# Patient Record
Sex: FEMALE | Race: WHITE | NOT HISPANIC OR LATINO | Employment: OTHER | URBAN - METROPOLITAN AREA
[De-identification: names, ages, dates, MRNs, and addresses within clinical notes are randomized per-mention and may not be internally consistent; named-entity substitution may affect disease eponyms.]

---

## 2017-06-05 DIAGNOSIS — E11.9 TYPE 2 DIABETES MELLITUS WITHOUT COMPLICATIONS (HCC): ICD-10-CM

## 2017-06-26 ENCOUNTER — ALLSCRIPTS OFFICE VISIT (OUTPATIENT)
Dept: OTHER | Facility: OTHER | Age: 82
End: 2017-06-26

## 2018-01-10 NOTE — PROGRESS NOTES
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 (250 40,585 4) (E11 22,N18 4)   2  Urinary incontinence (788 30) (R32)   3  Need for pneumococcal vaccination (V03 82) (Z23)   4  Medicare annual wellness visit, subsequent (V70 0) (Z00 00)    Plan  Medicare annual wellness visit, subsequent    · After you empty your bladder, wait a moment and try to go again  Do not strain or push to  empty ; Status:Active; Requested [id number];    · Begin a limited exercise program ; Status:Complete;   Done: 01MTE4951   · Brush your teeth {freq1} and floss at least once a day ; Status:Complete;   Done:  81XZO2511   · Eat a low fat and low cholesterol diet ; Status:Complete;   Done: 73FGW1083   · Eat no more than 30 grams of fat per day ; Status:Complete;   Done: 63UIT8793   · Keep a diary of when and what you eat ; Status:Complete;   Done: 53PMS7183   · Some eating tips that can help you lose weight ; Status:Complete;   Done: 71NCZ4649   · The plan of care for urinary incontinence is detailed in the plan and/or discussion section  of today's note ; Status:Complete;   Done: 18UYE5065   · There are many exercise options for seniors ; Status:Complete;   Done: 13OJZ0629   · There are ways to decrease your stress and improve your sense of well-being  We  encourage you to keep active and exercise regularly  Make time to take care of yourself  and participate in activities that you enjoy  Stay connected to friends and family that can  support and comfort you  If at any time you have thoughts of harming yourself or  someone else, contact us immediately ; Status:Active; Requested [id number];    · There ways to avoid falling ; Status:Complete;   Done: 10GJM5847   · These are things you can do to prevent falls in and around the home ; Status:Complete;    Done: 08SVD3580   · We encourage all of our patients to exercise regularly    30 minutes of exercise or physical  activity five or more days a week is recommended for children and adults ;  Status:Complete;   Done: 27FEM6711   · We encourage you to begin to make lifestyle changes to help control your blood  pressure  These may include losing weight, increasing your activity level, limiting salt in  your diet, decreasing alcohol intake, and eating a diet low in fat and rich in fruits  and vegetables ; Status:Complete;   Done: 01WQM8046   · We have prescribed Kegel exercises to be done in a set of 15 repetitions, 3 times a day ;  Status:Complete;   Done: 72QEG2490   · We recommend routine visits to a dentist ; Status:Complete;   Done: 45NPU7612   · We recommend that you bring your body mass index down to 26 ; Status:Complete;    Done: 00VAM8715   · We recommend that you create an advance directive ; Status:Complete;   Done:  98HYU4967   · We recommend you modify your diet to achieve and maintain a healthy weight  Being  overweight may increase your risk for developing health problems such as diabetes,  heart disease, and cancer  Avoid high fat foods and eat a balanced diet rich  in fruits and vegetables  The combination of a reduced-calorie diet and increased  physical activity is recommended  Please let us know if you would like to  learn more about your nutrition and calorie needs, and additional options including  weight loss programs that can help you achieve your goals ; Status:Complete;   Done:  60EXR7565   · We recommend you modify your diet to achieve and maintain a healthy weight  Being  underweight may increase your risk of developing health problems from vitamin and  mineral deficiencies  We recommend a balanced diet rich in fruits and vegetables  You  may also consider increasing your calorie intake by eating more frequently or adding  nuts, avocados, and low-fat cheese or milk to your meals    Please let us know  if you would like to learn more about your nutrition and calorie needs, and additional  options to help you achieve your weight goals ; Status:Complete;   Done: 70PNP3587   · Call [phone] if: You find a new or different kind of lump in your breast ;  Status:Complete;   Done: 73DGW1999   · Call [phone] if: You have any bleeding from the vagina ; Status:Complete;    Done: 51VPP2113   · Call [phone] if: You have any warning signs of skin cancer ; Status:Complete;    Done: 29PTM5724   · Call [phone] if: You experience a new kind of chest pain (angina) or pressure ;  Status:Complete;   Done: 84OVW3744  Need for pneumococcal vaccination    · Prevnar 13 Intramuscular Suspension; INJECT 0 5  ML Intramuscular; To Be  Done: 42HYA7784    Discussion/Summary  Impression: Subsequent Annual Wellness Visit  Cardiovascular screening and counseling: screening is current  Diabetes screening and counseling: screening not indicated  Colorectal cancer screening and counseling: screening is current  Breast cancer screening and counseling: screening not indicated  Cervical cancer screening and counseling: screening not indicated  Osteoporosis screening and counseling: screening is current  Abdominal aortic aneurysm screening and counseling: screening not indicated  Glaucoma screening and counseling: screening is current  HIV screening and counseling: screening not indicated  Immunizations: influenza vaccine is up to date this year, the lifetime pneumococcal vaccine has been completed, hepatitis B vaccination series is not indicated at this time due to the patient's low risk of tevin the disease, Zostavax vaccination up to date, Td vaccination status is unknown and Tdap vaccination up to date  Chief Complaint  MEDICARE WELLNESS      History of Present Illness  Welcome to Medicare and Wellness Visits: The patient is being seen for the subsequent annual wellness visit     Co-Managers and Medical Equipment/Suppliers: See Patient Care Team   Reviewed Updated Marton Halsted:   Last Medicare Wellness Visit Information was reviewed, patient interviewed, no change since last AWV  Preventive Quality Program 65 and Older: Falls Risk: The patient fell 0 times in the past 12 months  Urinary Incontinence Symptoms includes: urinary incontinence and nocturia        Patient Care Team    Care Team Member Role Specialty Office Number   Sue Gomez        Active Problems    1  Abnormal blood chemistry (790 6) (R79 9)   2  Allergic rhinitis (477 9) (J30 9)   3  Depression screen (V79 0) (Z13 89)   4  Diabetes mellitus type II, controlled (250 00) (E11 9)   5  Encounter for screening for other nervous system disorder (V80 09) (Z13 858)   6  Encounter for special screening examination for genitourinary disorder (V81 6) (Z13 89)   7  Esophageal reflux (530 81) (K21 9)   8  Glaucoma Screening   9  Hyperglycemia (790 29) (R73 9)   10  Hyperlipidemia (272 4) (E78 5)   11  Hypertension (401 9) (I10)   12  Hypertensive urgency (401 9) (I16 0)   13  Need for prophylactic vaccination and inoculation against influenza (V04 81) (Z23)   14  Need for Tdap vaccination (V06 1) (Z23)   15  Osteoarthritis (715 90) (M19 90)   16  Palpitations (785 1) (R00 2)   17  Screening for diabetes mellitus (DM) (V77 1) (Z13 1)   18  Screening for ischemic heart disease (V81 0) (Z13 6)   19  Screening for osteoporosis (V82 81) (Z13 820)    Past Medical History    · History of cellulitis (V13 3) (Z87 2)    Surgical History    · History of Appendectomy   · History of Breast Surgery Mastectomy   · History of Cataract Surgery   · History of Complete Colonoscopy   · History of Hand Surgery   · History of Total Abdominal Hysterectomy With Removal Of Both Ovaries    Family History  Mother    · Family history of Diabetes Mellitus (V18 0)   · Family history of Valvular Heart Disease  Father    · Family history of Coal Workers' Pneumoconiosis (Complicated)   · Family history of Diabetes Mellitus (V18 0)    Social History    · Never A Smoker    Current Meds   1  CloNIDine HCl - 0 1 MG Oral Tablet;  Take 1 tablet by mouth at bedtime; Therapy: 28FAC4891 to (Evaluate:12Uol2346)  Requested for: 93LKL2213; Last   Rx:62Qte0078 Ordered   2  Latanoprost 0 005 % Ophthalmic Solution; Therapy: 84LJI1892 to Recorded   3  Lisinopril 20 MG Oral Tablet; Take one tablet daily  Requested for: 57ZNT6704; Last   Rx:20Bai8561 Ordered   4  Sulindac 200 MG Oral Tablet; Take 1 tablet every 12 hours with food; Therapy: 34PZI0056 to (Evaluate:74Ogl2949)  Requested for: 20SSD0520; Last   Rx:21Wtl1652 Ordered   5  Triamterene-HCTZ 37 5-25 MG Oral Capsule; take 1 capsule daily; Therapy: 21SHT4170 to (Evaluate:02Hcb4705)  Requested for: 28CBG4366; Last   Rx:50Rpg5970 Ordered    Allergies    1  Lipitor TABS   2  Morphine Derivatives   3  TETANUS    Immunizations   1 2 3 4    Influenza  09-Oct-2012 2013 28-Oct-2014 05-Oct-2015    PPSV  2010       Tdap  24-Aug-2015 24-Aug-2015      Zoster  2013        Vitals  Signs    Heart Rate: 90  Respiration: 15  Systolic: 525  Diastolic: 68  Height: 5 ft 4 in  Weight: 189 lb   BMI Calculated: 32 44  BSA Calculated: 1 91  O2 Saturation: 96    Results/Data  CreatinineClearanceMDRD 99FMK8334 02:14PM Bernard Talihina     Test Name Result Flag Reference   Creatinine Clearance (MDRD) - CKD-EPI 29 53 ml/min/1 73 m²     Sex: Female  Age: 80  Ethnicity: White/  Serum Creatinine: 1 58 mg/dL  Traceable to IDMS: Yes   Creatinine Clearance (MDRD) - MDRD-GFR 31 08 ml/min/1 73 m²     Sex: Female  Age: 80  Ethnicity:  White/  Serum Creatinine: 1 58 mg/dL  Traceable to IDMS: Yes       Signatures   Electronically signed by : Chucky Frias MD; Jun 26 2017  2:23PM EST                       (Author)

## 2018-01-13 VITALS
WEIGHT: 189 LBS | HEIGHT: 64 IN | HEART RATE: 90 BPM | SYSTOLIC BLOOD PRESSURE: 136 MMHG | RESPIRATION RATE: 15 BRPM | OXYGEN SATURATION: 96 % | DIASTOLIC BLOOD PRESSURE: 68 MMHG | BODY MASS INDEX: 32.27 KG/M2

## 2018-02-14 DIAGNOSIS — I10 ESSENTIAL HYPERTENSION: Primary | ICD-10-CM

## 2018-02-14 RX ORDER — CLONIDINE HYDROCHLORIDE 0.1 MG/1
0.1 TABLET ORAL ONCE
Qty: 30 TABLET | Refills: 5 | Status: SHIPPED | OUTPATIENT
Start: 2018-02-14 | End: 2018-08-13 | Stop reason: SDUPTHER

## 2018-02-14 RX ORDER — TRIAMTERENE AND HYDROCHLOROTHIAZIDE 37.5; 25 MG/1; MG/1
1 CAPSULE ORAL DAILY
COMMUNITY
Start: 2013-02-15 | End: 2018-02-14 | Stop reason: SDUPTHER

## 2018-02-14 RX ORDER — CLONIDINE HYDROCHLORIDE 0.1 MG/1
1 TABLET ORAL
COMMUNITY
Start: 2013-02-15 | End: 2018-02-14 | Stop reason: SDUPTHER

## 2018-02-14 RX ORDER — TRIAMTERENE AND HYDROCHLOROTHIAZIDE 37.5; 25 MG/1; MG/1
1 CAPSULE ORAL DAILY
Qty: 30 CAPSULE | Refills: 5 | Status: SHIPPED | OUTPATIENT
Start: 2018-02-14 | End: 2018-08-13 | Stop reason: SDUPTHER

## 2018-02-14 RX ORDER — LISINOPRIL 20 MG/1
20 TABLET ORAL DAILY
Qty: 30 TABLET | Refills: 5 | Status: SHIPPED | OUTPATIENT
Start: 2018-02-14 | End: 2018-08-13 | Stop reason: SDUPTHER

## 2018-02-14 RX ORDER — LISINOPRIL 20 MG/1
1 TABLET ORAL DAILY
COMMUNITY
End: 2018-02-14 | Stop reason: SDUPTHER

## 2018-08-13 DIAGNOSIS — I10 ESSENTIAL HYPERTENSION: ICD-10-CM

## 2018-08-13 RX ORDER — SULINDAC 200 MG/1
200 TABLET ORAL 2 TIMES DAILY
Qty: 180 TABLET | Refills: 3 | Status: SHIPPED | OUTPATIENT
Start: 2018-08-13 | End: 2019-12-04 | Stop reason: SDUPTHER

## 2018-08-13 RX ORDER — LISINOPRIL 20 MG/1
20 TABLET ORAL DAILY
Qty: 30 TABLET | Refills: 0 | Status: SHIPPED | OUTPATIENT
Start: 2018-08-13 | End: 2018-09-17 | Stop reason: SDUPTHER

## 2018-08-13 RX ORDER — SULINDAC 200 MG/1
1 TABLET ORAL
COMMUNITY
Start: 2013-02-15 | End: 2018-08-13 | Stop reason: SDUPTHER

## 2018-08-13 RX ORDER — TRIAMTERENE AND HYDROCHLOROTHIAZIDE 37.5; 25 MG/1; MG/1
1 CAPSULE ORAL DAILY
Qty: 30 CAPSULE | Refills: 0 | Status: SHIPPED | OUTPATIENT
Start: 2018-08-13 | End: 2018-09-17 | Stop reason: SDUPTHER

## 2018-08-13 RX ORDER — CLONIDINE HYDROCHLORIDE 0.1 MG/1
0.1 TABLET ORAL ONCE
Qty: 30 TABLET | Refills: 0 | Status: SHIPPED | OUTPATIENT
Start: 2018-08-13 | End: 2018-09-17 | Stop reason: SDUPTHER

## 2018-09-17 DIAGNOSIS — I10 ESSENTIAL HYPERTENSION: ICD-10-CM

## 2018-09-17 RX ORDER — CLONIDINE HYDROCHLORIDE 0.1 MG/1
0.1 TABLET ORAL ONCE
Qty: 30 TABLET | Refills: 5 | Status: SHIPPED | OUTPATIENT
Start: 2018-09-17 | End: 2018-09-17

## 2018-09-17 RX ORDER — LISINOPRIL 20 MG/1
20 TABLET ORAL DAILY
Qty: 30 TABLET | Refills: 5 | Status: SHIPPED | OUTPATIENT
Start: 2018-09-17 | End: 2019-03-14 | Stop reason: SDUPTHER

## 2018-09-17 RX ORDER — TRIAMTERENE AND HYDROCHLOROTHIAZIDE 37.5; 25 MG/1; MG/1
1 CAPSULE ORAL DAILY
Qty: 30 CAPSULE | Refills: 5 | Status: SHIPPED | OUTPATIENT
Start: 2018-09-17 | End: 2019-03-14 | Stop reason: SDUPTHER

## 2018-11-06 RX ORDER — FLUTICASONE PROPIONATE 50 MCG
SPRAY, SUSPENSION (ML) NASAL
COMMUNITY
End: 2021-06-21

## 2018-11-06 RX ORDER — ASPIRIN 81 MG/1
TABLET, CHEWABLE ORAL
COMMUNITY
Start: 2013-11-17 | End: 2021-06-21

## 2018-11-06 RX ORDER — CHLORAL HYDRATE 500 MG
CAPSULE ORAL
COMMUNITY
End: 2018-11-09 | Stop reason: SDUPTHER

## 2018-11-06 RX ORDER — LATANOPROST 50 UG/ML
SOLUTION/ DROPS OPHTHALMIC
COMMUNITY
Start: 2014-07-17 | End: 2021-06-21

## 2018-11-07 ENCOUNTER — OFFICE VISIT (OUTPATIENT)
Dept: FAMILY MEDICINE CLINIC | Facility: CLINIC | Age: 83
End: 2018-11-07
Payer: MEDICARE

## 2018-11-07 VITALS
BODY MASS INDEX: 30.38 KG/M2 | SYSTOLIC BLOOD PRESSURE: 134 MMHG | TEMPERATURE: 99 F | WEIGHT: 177 LBS | OXYGEN SATURATION: 98 % | DIASTOLIC BLOOD PRESSURE: 60 MMHG | HEART RATE: 86 BPM

## 2018-11-07 DIAGNOSIS — M25.579 ARTHRALGIA OF TOE, UNSPECIFIED LATERALITY: ICD-10-CM

## 2018-11-07 DIAGNOSIS — E55.9 VITAMIN D DEFICIENCY: ICD-10-CM

## 2018-11-07 DIAGNOSIS — Z13.29 SCREENING FOR HYPOTHYROIDISM: ICD-10-CM

## 2018-11-07 DIAGNOSIS — L97.509 ULCER OF FOOT, UNSPECIFIED LATERALITY, UNSPECIFIED ULCER STAGE (HCC): Primary | ICD-10-CM

## 2018-11-07 DIAGNOSIS — Z13.0 SCREENING FOR DEFICIENCY ANEMIA: ICD-10-CM

## 2018-11-07 DIAGNOSIS — R73.9 HYPERGLYCEMIA: ICD-10-CM

## 2018-11-07 DIAGNOSIS — E53.8 VITAMIN B 12 DEFICIENCY: ICD-10-CM

## 2018-11-07 DIAGNOSIS — Z13.1 SCREENING FOR DIABETES MELLITUS: ICD-10-CM

## 2018-11-07 DIAGNOSIS — M21.962 DEFORMITY OF METATARSAL BONE OF LEFT FOOT: ICD-10-CM

## 2018-11-07 DIAGNOSIS — Z13.220 SCREENING FOR HYPERLIPIDEMIA: ICD-10-CM

## 2018-11-07 PROCEDURE — 99214 OFFICE O/P EST MOD 30 MIN: CPT | Performed by: NURSE PRACTITIONER

## 2018-11-07 RX ORDER — CEPHALEXIN 250 MG/1
500 CAPSULE ORAL 4 TIMES DAILY
Qty: 24 CAPSULE | Refills: 0 | Status: SHIPPED | OUTPATIENT
Start: 2018-11-07 | End: 2018-11-10

## 2018-11-07 RX ORDER — ACETAMINOPHEN 500 MG
500 TABLET ORAL EVERY 4 HOURS PRN
Qty: 120 TABLET | Refills: 0 | Status: SHIPPED | OUTPATIENT
Start: 2018-11-07 | End: 2021-12-27 | Stop reason: ALTCHOICE

## 2018-11-07 RX ORDER — IBUPROFEN 800 MG/1
800 TABLET ORAL EVERY 8 HOURS PRN
Qty: 90 TABLET | Refills: 1 | Status: SHIPPED | OUTPATIENT
Start: 2018-11-07 | End: 2018-11-07 | Stop reason: ALTCHOICE

## 2018-11-07 NOTE — PATIENT INSTRUCTIONS
Diabetic Foot Ulcers   WHAT YOU NEED TO KNOW:   What is a diabetic foot ulcer? A diabetic foot ulcer is an open sore that can develop anywhere on your foot or toes  The ulcers usually develop on the bottom of the foot  You may first notice drainage on your sock  Drainage is fluid that may be yellow, brown, or red  The fluid may also contain pus or blood  What increases my risk for a diabetic foot ulcer? · Blood sugar levels that are not controlled    · Nerve damage and numbness in your feet    · Poor blood flow     · A foot deformity, such as a bunion or hammertoe    · Calluses or corns on your feet or toes    · A decrease in vision that keeps you from seeing your feet clearly    · Being overweight    · Cigarette smoking or alcohol use  How is a diabetic foot ulcer diagnosed and treated? Your healthcare provider will ask about your symptoms and examine your foot and the ulcer  He may check your shoes  He may also send you to a podiatrist (foot doctor) for treatment  The goal of treatment is to start healing your foot ulcer as soon as possible  The risk for infection decreases with faster healing  Do the following to help your ulcer heal:  · Prevent or treat an infection  A bandage will be put on your ulcer  Your healthcare provider will give you instructions on changing your bandage  You may need to clean the wound and change the bandage daily  The bandage may contain medicines to help your ulcer heal  You may be asked to put medicine on your foot ulcer before you put on the bandage  The medicine may also prevent growth of tissue that is not healthy  If you have an infection, your healthcare provider will give you antibiotics to treat it  · Have any dead tissue debrided (removed)  The removal of dead skin and tissue around your foot ulcer can help with healing  · Manage your blood sugar levels and other health problems    Your blood sugar, blood pressure, and cholesterol levels need to be controlled to help your foot ulcer heal  Your healthcare provider can help you make a plan to manage your health problems  · Offload (take the pressure off) the foot ulcer  You may need special shoes with insoles, cushions, or braces  You may be asked to use a wheelchair or crutches until your foot ulcer heals  These items will help keep pressure and irritation off the area of your foot ulcer  Your foot ulcer can heal faster without pressure and irritation  · Have blood flow to your foot increased  Your healthcare provider may use hyperbaric oxygen therapy or negative pressure wound therapy to increase blood flow  Ask for more information about these therapies  · Go to specialists as directed  Your healthcare provider may recommend you see a podiatrist, or an orthopedic or vascular surgeon  These healthcare providers can help manage your treatment  What can I do to prevent diabetic foot ulcers? Good foot care may help prevent ulcers, or keep them from getting worse  Ask someone to help you if you are not able to check or care for your feet  The following can help you prevent diabetic foot ulcers:  · Keep your blood sugar levels under control  Continue the plan for your diabetes that you and your healthcare provider have discussed  Healthy food choices and taking your medicines as directed may help control blood sugars  Contact your healthcare provider if your blood sugar levels are higher than directed  · Wash your feet each day with soap and warm water  Do not use hot water, because this can injure your foot  Dry your feet gently with a towel after you wash them  Dry between and under your toes  Put lotion or moisturizer on your feet  Do not  put lotion or moisturizer between your toes  · Check your feet each day  Look at your whole foot, including the bottom, and between and under your toes  Check for wounds, corns, and calluses   Feel your feet by running your hands along the tops, bottoms, sides, and between your toes  Use a nonbreakable mirror to check your feet if you have trouble seeing the bottoms  Do not try to remove corns or calluses yourself  File or cut your toenails straight across  · Protect your feet  Do not walk barefoot or wear your shoes without socks  Check your shoes for rocks or other objects that can hurt your feet  Wear cotton socks to help keep your feet dry  Wear socks without toe seams, or wear them with the seams inside out  Change your socks each day  Do not wear socks that are dirty or damp  · Wear shoes that fit well  Wear shoes that do not rub against any area of your feet  Your shoes should be ½ to ¾ inch (1 to 2 centimeters) longer than your feet  Your shoes should also have extra space around the widest part of your feet  Walking or athletic shoes with laces or straps that adjust are best  Ask your healthcare provider for help to choose the right shoes for you  Ask him if you need to wear an insert, orthotic, or bandage on your feet  · Do not smoke  Nicotine can damage your blood vessels and increase your risk for foot ulcers  Do not use e-cigarettes or smokeless tobacco in place of cigarettes or to help you quit  They still contain nicotine  Ask your healthcare provider for information if you currently smoke and need help quitting  · Do not drink alcohol  Alcohol increases your blood sugar levels and make your diabetes more difficult to manage  Ask your healthcare provider for information if you need help to quit drinking alcohol  · Maintain a healthy weight  Ask your healthcare provider how much you should weigh  A healthy weight can help you control your diabetes  Ask him to help you create a weight loss plan if you are overweight  Even a 10 to 15 pound weight loss can help you manage your blood sugar level  When should I or someone close to me call 911? · You feel faint or become confused  When should I seek immediate care?    · You have a fever with chills  · You begin vomiting  When should I contact my healthcare provider? · You see new drainage on your sock  · Your foot becomes red, warm, and swollen  · Your foot ulcer has a bad smell or is draining pus  · You feel pain in a foot that used to have little or no feeling  · You see black or dead tissue in or around your ulcer  · Your ulcer becomes bigger, deeper, or does not heal      · You have questions or concerns about your condition or care  CARE AGREEMENT:   You have the right to help plan your care  Learn about your health condition and how it may be treated  Discuss treatment options with your caregivers to decide what care you want to receive  You always have the right to refuse treatment  The above information is an  only  It is not intended as medical advice for individual conditions or treatments  Talk to your doctor, nurse or pharmacist before following any medical regimen to see if it is safe and effective for you  © 2017 [employer] Information is for End User's use only and may not be sold, redistributed or otherwise used for commercial purposes  All illustrations and images included in CareNotes® are the copyrighted property of [employer]  or Russell Roberts

## 2018-11-07 NOTE — PROGRESS NOTES
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  -     Ambulatory referral to Podiatry; Future    Screening for deficiency anemia  -     CBC and differential    Screening for diabetes mellitus  -     Comprehensive metabolic panel  -     Insulin, fasting  -     Hemoglobin A1C    Hyperglycemia  -     Comprehensive metabolic panel  -     Insulin, fasting  -     Hemoglobin A1C    Screening for hyperlipidemia  -     Lipid panel    Screening for hypothyroidism  -     TSH, 3rd generation    Arthralgia of toe, unspecified laterality  -     Uric acid  -     Discontinue: ibuprofen (MOTRIN) 800 mg tablet; Take 1 tablet (800 mg total) by mouth every 8 (eight) hours as needed for mild pain with food  -     acetaminophen (TYLENOL) 500 mg tablet; Take 1 tablet (500 mg total) by mouth every 4 (four) hours as needed for mild pain    Vitamin D deficiency  -     Vitamin D 1,25 dihydroxy    Vitamin B 12 deficiency  -     Vitamin B12    Other orders  -     latanoprost (XALATAN) 0 005 % ophthalmic solution; Apply to eye  -     Multiple Vitamins-Minerals (TGT MULTIVITAMIN/MULTIMINERAL) TABS; Take by mouth  -     lysine 500 MG TABS; Take by mouth  -     fluticasone (FLONASE) 50 mcg/act nasal spray; into each nostril  -     Omega-3 Fatty Acids (FISH OIL) 1,000 mg; Take by mouth  -     aspirin 81 mg chewable tablet; Chew          Subjective:      Patient ID: Liat Alvarez is a 80 y o  female  Patient presents to [address] [place] [address] with complaints of pain to the bilateral feet  Allergies, medical history and current medications reviewed with patient; patient reports taking all medications as prescribed without issues   Patient reports pain in feet has been ongoing for several weeks  Patient reports about 1 5 years ago her toe was very red and was given antibiotics  Patient reports she thinks the big toe is rubbing up against the 2nd toe  Patient also C/O a "bruise" to the left thumbnail  Patient denies any trauma, but reports that the nail started by turning green before getting dark and tunneling under the fingernail; patient denies pain, redness or swelling and reports that it seems to be "going away "       Review of Systems   Constitutional: Negative for activity change, appetite change, chills, fatigue, fever and unexpected weight change  HENT: Negative for congestion, ear pain, hearing loss, postnasal drip, rhinorrhea, sinus pressure, sore throat and voice change  Eyes: Negative for pain, itching and visual disturbance  Respiratory: Negative for cough, chest tightness and shortness of breath  Cardiovascular: Negative for chest pain, palpitations and leg swelling  Gastrointestinal: Negative for abdominal pain, blood in stool, constipation, diarrhea, nausea and vomiting  Endocrine: Negative for cold intolerance and heat intolerance  Genitourinary: Negative for difficulty urinating, dysuria, frequency, hematuria and urgency  Leakage   Musculoskeletal: Positive for arthralgias and joint swelling  Negative for back pain and myalgias  Skin: Positive for wound  Negative for color change and rash  Allergic/Immunologic: Negative  Neurological: Negative for dizziness, weakness, light-headedness, numbness and headaches  Hematological: Negative  Psychiatric/Behavioral: Negative  Objective:    /60 (BP Location: Left arm, Patient Position: Sitting, Cuff Size: Standard)   Pulse 86   Temp 99 °F (37 2 °C) (Temporal)   Wt 80 3 kg (177 lb)   SpO2 98%   BMI 30 38 kg/m²      Physical Exam   Constitutional: She is oriented to person, place, and time  She appears well-developed and well-nourished  No distress  HENT:   Head: Normocephalic and atraumatic  Right Ear: Tympanic membrane, external ear and ear canal normal    Left Ear: Tympanic membrane, external ear and ear canal normal    Nose: Nose normal  No mucosal edema  Right sinus exhibits no maxillary sinus tenderness and no frontal sinus tenderness  Left sinus exhibits no maxillary sinus tenderness and no frontal sinus tenderness  Mouth/Throat: Uvula is midline, oropharynx is clear and moist and mucous membranes are normal  No oropharyngeal exudate, posterior oropharyngeal edema or posterior oropharyngeal erythema  Nasal turbinates pink, moist and without exudate  Eyes: Pupils are equal, round, and reactive to light  Conjunctivae, EOM and lids are normal  Right eye exhibits no discharge  Left eye exhibits no discharge  Right conjunctiva is not injected  Left conjunctiva is not injected  Neck: Normal range of motion  Neck supple  No tracheal deviation, no edema and no erythema present  No thyromegaly present  Cardiovascular: Normal rate, regular rhythm, S1 normal and S2 normal     Murmur heard  Pulses:       Radial pulses are 2+ on the right side, and 2+ on the left side  Dorsalis pedis pulses are 2+ on the right side, and 2+ on the left side  Pulmonary/Chest: Effort normal and breath sounds normal  No respiratory distress  Abdominal: Soft  Bowel sounds are normal  She exhibits no distension and no mass  There is no hepatosplenomegaly  There is no tenderness  There is no guarding  Musculoskeletal: Normal range of motion  She exhibits no edema, tenderness or deformity  Lymphadenopathy:     She has no cervical adenopathy  Neurological: She is alert and oriented to person, place, and time  She has normal strength and normal reflexes  She displays a negative Romberg sign  Skin: Skin is warm, dry and intact  Left lower extremity cool to touch; pulses intact   Psychiatric: She has a normal mood and affect   Her speech is normal  Nursing note and vitals reviewed

## 2018-11-08 ENCOUNTER — CLINICAL SUPPORT (OUTPATIENT)
Dept: FAMILY MEDICINE CLINIC | Facility: CLINIC | Age: 83
End: 2018-11-08
Payer: MEDICARE

## 2018-11-08 DIAGNOSIS — E55.9 VITAMIN D DEFICIENCY: ICD-10-CM

## 2018-11-08 DIAGNOSIS — Z13.29 SCREENING FOR HYPOTHYROIDISM: ICD-10-CM

## 2018-11-08 DIAGNOSIS — Z13.0 SCREENING FOR DEFICIENCY ANEMIA: ICD-10-CM

## 2018-11-08 DIAGNOSIS — M25.579 ARTHRALGIA OF TOE, UNSPECIFIED LATERALITY: Primary | ICD-10-CM

## 2018-11-08 DIAGNOSIS — Z13.220 SCREENING FOR HYPERLIPIDEMIA: ICD-10-CM

## 2018-11-08 DIAGNOSIS — Z13.1 SCREENING FOR DIABETES MELLITUS: ICD-10-CM

## 2018-11-08 DIAGNOSIS — E53.8 VITAMIN B 12 DEFICIENCY: ICD-10-CM

## 2018-11-08 LAB
ALBUMIN SERPL BCP-MCNC: 3.9 G/DL (ref 3.5–5)
ALP SERPL-CCNC: 140 U/L (ref 46–116)
ALT SERPL W P-5'-P-CCNC: 18 U/L (ref 12–78)
ANION GAP SERPL CALCULATED.3IONS-SCNC: 9 MMOL/L (ref 4–13)
AST SERPL W P-5'-P-CCNC: 19 U/L (ref 5–45)
BASOPHILS # BLD AUTO: 0.05 THOUSANDS/ΜL (ref 0–0.1)
BASOPHILS NFR BLD AUTO: 1 % (ref 0–1)
BILIRUB SERPL-MCNC: 0.65 MG/DL (ref 0.2–1)
BUN SERPL-MCNC: 64 MG/DL (ref 5–25)
CALCIUM SERPL-MCNC: 9.2 MG/DL (ref 8.3–10.1)
CHLORIDE SERPL-SCNC: 105 MMOL/L (ref 100–108)
CHOLEST SERPL-MCNC: 257 MG/DL (ref 50–200)
CO2 SERPL-SCNC: 19 MMOL/L (ref 21–32)
CREAT SERPL-MCNC: 1.69 MG/DL (ref 0.6–1.3)
EOSINOPHIL # BLD AUTO: 0.19 THOUSAND/ΜL (ref 0–0.61)
EOSINOPHIL NFR BLD AUTO: 3 % (ref 0–6)
ERYTHROCYTE [DISTWIDTH] IN BLOOD BY AUTOMATED COUNT: 12.9 % (ref 11.6–15.1)
EST. AVERAGE GLUCOSE BLD GHB EST-MCNC: 134 MG/DL
GFR SERPL CREATININE-BSD FRML MDRD: 27 ML/MIN/1.73SQ M
GLUCOSE P FAST SERPL-MCNC: 124 MG/DL (ref 65–99)
HBA1C MFR BLD: 6.3 % (ref 4.2–6.3)
HCT VFR BLD AUTO: 43.7 % (ref 34.8–46.1)
HDLC SERPL-MCNC: 57 MG/DL (ref 40–60)
HGB BLD-MCNC: 13.8 G/DL (ref 11.5–15.4)
IMM GRANULOCYTES # BLD AUTO: 0.03 THOUSAND/UL (ref 0–0.2)
IMM GRANULOCYTES NFR BLD AUTO: 0 % (ref 0–2)
LDLC SERPL CALC-MCNC: 154 MG/DL (ref 0–100)
LYMPHOCYTES # BLD AUTO: 1.32 THOUSANDS/ΜL (ref 0.6–4.47)
LYMPHOCYTES NFR BLD AUTO: 17 % (ref 14–44)
MCH RBC QN AUTO: 31.2 PG (ref 26.8–34.3)
MCHC RBC AUTO-ENTMCNC: 31.6 G/DL (ref 31.4–37.4)
MCV RBC AUTO: 99 FL (ref 82–98)
MONOCYTES # BLD AUTO: 0.58 THOUSAND/ΜL (ref 0.17–1.22)
MONOCYTES NFR BLD AUTO: 8 % (ref 4–12)
NEUTROPHILS # BLD AUTO: 5.4 THOUSANDS/ΜL (ref 1.85–7.62)
NEUTS SEG NFR BLD AUTO: 71 % (ref 43–75)
NONHDLC SERPL-MCNC: 200 MG/DL
NRBC BLD AUTO-RTO: 0 /100 WBCS
PLATELET # BLD AUTO: 282 THOUSANDS/UL (ref 149–390)
PMV BLD AUTO: 11 FL (ref 8.9–12.7)
POTASSIUM SERPL-SCNC: 5.5 MMOL/L (ref 3.5–5.3)
PROT SERPL-MCNC: 7.7 G/DL (ref 6.4–8.2)
RBC # BLD AUTO: 4.43 MILLION/UL (ref 3.81–5.12)
SODIUM SERPL-SCNC: 133 MMOL/L (ref 136–145)
TRIGL SERPL-MCNC: 229 MG/DL
TSH SERPL DL<=0.05 MIU/L-ACNC: 4.3 UIU/ML (ref 0.36–3.74)
URATE SERPL-MCNC: 8.5 MG/DL (ref 2–6.8)
VIT B12 SERPL-MCNC: 633 PG/ML (ref 100–900)
WBC # BLD AUTO: 7.57 THOUSAND/UL (ref 4.31–10.16)

## 2018-11-08 PROCEDURE — 80061 LIPID PANEL: CPT | Performed by: NURSE PRACTITIONER

## 2018-11-08 PROCEDURE — 85025 COMPLETE CBC W/AUTO DIFF WBC: CPT | Performed by: NURSE PRACTITIONER

## 2018-11-08 PROCEDURE — 83036 HEMOGLOBIN GLYCOSYLATED A1C: CPT | Performed by: NURSE PRACTITIONER

## 2018-11-08 PROCEDURE — 84443 ASSAY THYROID STIM HORMONE: CPT | Performed by: NURSE PRACTITIONER

## 2018-11-08 PROCEDURE — 82306 VITAMIN D 25 HYDROXY: CPT | Performed by: NURSE PRACTITIONER

## 2018-11-08 PROCEDURE — 80053 COMPREHEN METABOLIC PANEL: CPT | Performed by: NURSE PRACTITIONER

## 2018-11-08 PROCEDURE — 36415 COLL VENOUS BLD VENIPUNCTURE: CPT | Performed by: NURSE PRACTITIONER

## 2018-11-08 PROCEDURE — 83525 ASSAY OF INSULIN: CPT | Performed by: NURSE PRACTITIONER

## 2018-11-08 PROCEDURE — 82607 VITAMIN B-12: CPT | Performed by: NURSE PRACTITIONER

## 2018-11-08 PROCEDURE — 84550 ASSAY OF BLOOD/URIC ACID: CPT | Performed by: NURSE PRACTITIONER

## 2018-11-09 ENCOUNTER — TELEPHONE (OUTPATIENT)
Dept: FAMILY MEDICINE CLINIC | Facility: CLINIC | Age: 83
End: 2018-11-09

## 2018-11-09 DIAGNOSIS — E03.9 HYPOTHYROIDISM, UNSPECIFIED TYPE: ICD-10-CM

## 2018-11-09 DIAGNOSIS — M25.9 REDNESS OF JOINT: ICD-10-CM

## 2018-11-09 DIAGNOSIS — R79.9 ELEVATED BUN: ICD-10-CM

## 2018-11-09 DIAGNOSIS — E79.0 ELEVATED URIC ACID IN BLOOD: Primary | ICD-10-CM

## 2018-11-09 DIAGNOSIS — Z87.448 HISTORY OF KIDNEY DISEASE: ICD-10-CM

## 2018-11-09 DIAGNOSIS — R79.89 ELEVATED SERUM CREATININE: ICD-10-CM

## 2018-11-09 DIAGNOSIS — E78.2 MIXED HYPERLIPIDEMIA: ICD-10-CM

## 2018-11-09 DIAGNOSIS — I10 ESSENTIAL HYPERTENSION: ICD-10-CM

## 2018-11-09 DIAGNOSIS — R79.89 ELEVATED TSH: ICD-10-CM

## 2018-11-09 DIAGNOSIS — R73.9 HYPERGLYCEMIA: ICD-10-CM

## 2018-11-09 DIAGNOSIS — E53.8 VITAMIN B 12 DEFICIENCY: ICD-10-CM

## 2018-11-09 DIAGNOSIS — E55.9 VITAMIN D DEFICIENCY: ICD-10-CM

## 2018-11-09 DIAGNOSIS — E78.1 HYPERTRIGLYCERIDEMIA: ICD-10-CM

## 2018-11-09 DIAGNOSIS — Z13.0 SCREENING FOR DEFICIENCY ANEMIA: ICD-10-CM

## 2018-11-09 DIAGNOSIS — Z13.1 SCREENING FOR DIABETES MELLITUS: ICD-10-CM

## 2018-11-09 DIAGNOSIS — E87.8 ELECTROLYTE IMBALANCE: ICD-10-CM

## 2018-11-09 PROBLEM — E11.22 DM TYPE 2 CAUSING CKD STAGE 4 (HCC): Status: ACTIVE | Noted: 2017-06-26

## 2018-11-09 PROBLEM — N18.4 DM TYPE 2 CAUSING CKD STAGE 4 (HCC): Status: ACTIVE | Noted: 2017-06-26

## 2018-11-09 PROBLEM — R01.1 CARDIAC MURMUR, PREVIOUSLY UNDIAGNOSED: Status: ACTIVE | Noted: 2018-01-27

## 2018-11-09 LAB
25(OH)D3 SERPL-MCNC: 13.8 NG/ML (ref 30–100)
INSULIN SERPL-ACNC: 8.3 MU/L (ref 3–25)

## 2018-11-09 RX ORDER — ERGOCALCIFEROL 1.25 MG/1
50000 CAPSULE ORAL WEEKLY
Qty: 4 CAPSULE | Refills: 2 | Status: SHIPPED | OUTPATIENT
Start: 2018-11-09 | End: 2019-12-04

## 2018-11-09 RX ORDER — CHLORAL HYDRATE 500 MG
2000 CAPSULE ORAL 2 TIMES DAILY
Qty: 120 CAPSULE | Refills: 2 | Status: SHIPPED | OUTPATIENT
Start: 2018-11-09 | End: 2021-06-21

## 2018-11-13 DIAGNOSIS — R79.9 ELEVATED BUN: Primary | ICD-10-CM

## 2018-11-13 DIAGNOSIS — R79.89 ELEVATED SERUM CREATININE: ICD-10-CM

## 2018-11-13 DIAGNOSIS — E87.8 ELECTROLYTE IMBALANCE: ICD-10-CM

## 2018-11-13 DIAGNOSIS — Z87.448 HISTORY OF KIDNEY DISEASE: ICD-10-CM

## 2018-11-13 DIAGNOSIS — I10 ESSENTIAL HYPERTENSION: ICD-10-CM

## 2018-11-29 PROBLEM — R32 URINARY INCONTINENCE: Status: ACTIVE | Noted: 2017-06-26

## 2018-11-29 PROBLEM — M1A.3790: Status: ACTIVE | Noted: 2018-11-29

## 2018-12-04 ENCOUNTER — LAB REQUISITION (OUTPATIENT)
Dept: LAB | Facility: HOSPITAL | Age: 83
End: 2018-12-04
Payer: MEDICARE

## 2018-12-04 DIAGNOSIS — R79.89 OTHER SPECIFIED ABNORMAL FINDINGS OF BLOOD CHEMISTRY: ICD-10-CM

## 2018-12-04 DIAGNOSIS — E11.9 TYPE 2 DIABETES MELLITUS WITHOUT COMPLICATIONS (HCC): ICD-10-CM

## 2018-12-04 DIAGNOSIS — E78.5 HYPERLIPIDEMIA: ICD-10-CM

## 2018-12-04 DIAGNOSIS — R79.9 ABNORMAL FINDING OF BLOOD CHEMISTRY: ICD-10-CM

## 2018-12-04 DIAGNOSIS — M19.90 OSTEOARTHRITIS: ICD-10-CM

## 2018-12-04 DIAGNOSIS — N18.4 CHRONIC KIDNEY DISEASE, STAGE IV (SEVERE) (HCC): ICD-10-CM

## 2018-12-04 DIAGNOSIS — I10 ESSENTIAL (PRIMARY) HYPERTENSION: ICD-10-CM

## 2018-12-04 DIAGNOSIS — K21.9 GASTRO-ESOPHAGEAL REFLUX DISEASE WITHOUT ESOPHAGITIS: ICD-10-CM

## 2018-12-04 DIAGNOSIS — E87.8 OTHER DISORDERS OF ELECTROLYTE AND FLUID BALANCE, NOT ELSEWHERE CLASSIFIED: ICD-10-CM

## 2018-12-04 DIAGNOSIS — Z87.448 PERSONAL HISTORY OF OTHER DISEASES OF URINARY SYSTEM: ICD-10-CM

## 2018-12-04 LAB
ALBUMIN SERPL BCP-MCNC: 3.8 G/DL (ref 3.5–5)
ALP SERPL-CCNC: 119 U/L (ref 46–116)
ALT SERPL W P-5'-P-CCNC: 20 U/L (ref 12–78)
ANION GAP SERPL CALCULATED.3IONS-SCNC: 8 MMOL/L (ref 4–13)
AST SERPL W P-5'-P-CCNC: 13 U/L (ref 5–45)
BACTERIA UR QL AUTO: ABNORMAL /HPF
BASOPHILS # BLD AUTO: 0.07 THOUSANDS/ΜL (ref 0–0.1)
BASOPHILS NFR BLD AUTO: 1 % (ref 0–1)
BILIRUB SERPL-MCNC: 0.47 MG/DL (ref 0.2–1)
BILIRUB UR QL STRIP: NEGATIVE
BUN SERPL-MCNC: 44 MG/DL (ref 5–25)
CALCIUM SERPL-MCNC: 9.8 MG/DL (ref 8.3–10.1)
CHLORIDE SERPL-SCNC: 107 MMOL/L (ref 100–108)
CLARITY UR: ABNORMAL
CO2 SERPL-SCNC: 21 MMOL/L (ref 21–32)
COLOR UR: YELLOW
CREAT SERPL-MCNC: 1.39 MG/DL (ref 0.6–1.3)
EOSINOPHIL # BLD AUTO: 0.24 THOUSAND/ΜL (ref 0–0.61)
EOSINOPHIL NFR BLD AUTO: 3 % (ref 0–6)
ERYTHROCYTE [DISTWIDTH] IN BLOOD BY AUTOMATED COUNT: 12.8 % (ref 11.6–15.1)
GFR SERPL CREATININE-BSD FRML MDRD: 34 ML/MIN/1.73SQ M
GLUCOSE SERPL-MCNC: 130 MG/DL (ref 65–140)
GLUCOSE UR STRIP-MCNC: NEGATIVE MG/DL
HCT VFR BLD AUTO: 41.7 % (ref 34.8–46.1)
HGB BLD-MCNC: 13.4 G/DL (ref 11.5–15.4)
HGB UR QL STRIP.AUTO: NEGATIVE
HYALINE CASTS #/AREA URNS LPF: ABNORMAL /LPF
IMM GRANULOCYTES # BLD AUTO: 0.03 THOUSAND/UL (ref 0–0.2)
IMM GRANULOCYTES NFR BLD AUTO: 0 % (ref 0–2)
KETONES UR STRIP-MCNC: NEGATIVE MG/DL
LEUKOCYTE ESTERASE UR QL STRIP: ABNORMAL
LYMPHOCYTES # BLD AUTO: 1.58 THOUSANDS/ΜL (ref 0.6–4.47)
LYMPHOCYTES NFR BLD AUTO: 18 % (ref 14–44)
MCH RBC QN AUTO: 31.2 PG (ref 26.8–34.3)
MCHC RBC AUTO-ENTMCNC: 32.1 G/DL (ref 31.4–37.4)
MCV RBC AUTO: 97 FL (ref 82–98)
MONOCYTES # BLD AUTO: 0.71 THOUSAND/ΜL (ref 0.17–1.22)
MONOCYTES NFR BLD AUTO: 8 % (ref 4–12)
NEUTROPHILS # BLD AUTO: 6.02 THOUSANDS/ΜL (ref 1.85–7.62)
NEUTS SEG NFR BLD AUTO: 70 % (ref 43–75)
NITRITE UR QL STRIP: NEGATIVE
NON-SQ EPI CELLS URNS QL MICRO: ABNORMAL /HPF
NRBC BLD AUTO-RTO: 0 /100 WBCS
PH UR STRIP.AUTO: 5 [PH] (ref 4.5–8)
PLATELET # BLD AUTO: 262 THOUSANDS/UL (ref 149–390)
PMV BLD AUTO: 10.2 FL (ref 8.9–12.7)
POTASSIUM SERPL-SCNC: 5.3 MMOL/L (ref 3.5–5.3)
PROT SERPL-MCNC: 7.5 G/DL (ref 6.4–8.2)
PROT UR STRIP-MCNC: NEGATIVE MG/DL
RBC # BLD AUTO: 4.3 MILLION/UL (ref 3.81–5.12)
RBC #/AREA URNS AUTO: ABNORMAL /HPF
SODIUM SERPL-SCNC: 136 MMOL/L (ref 136–145)
SP GR UR STRIP.AUTO: 1.01 (ref 1–1.03)
TSH SERPL DL<=0.05 MIU/L-ACNC: 3.59 UIU/ML (ref 0.36–3.74)
URATE SERPL-MCNC: 6.5 MG/DL (ref 2–6.8)
UROBILINOGEN UR QL STRIP.AUTO: 0.2 E.U./DL
WBC # BLD AUTO: 8.65 THOUSAND/UL (ref 4.31–10.16)
WBC #/AREA URNS AUTO: ABNORMAL /HPF

## 2018-12-04 PROCEDURE — 80053 COMPREHEN METABOLIC PANEL: CPT | Performed by: INTERNAL MEDICINE

## 2018-12-04 PROCEDURE — 83036 HEMOGLOBIN GLYCOSYLATED A1C: CPT | Performed by: INTERNAL MEDICINE

## 2018-12-04 PROCEDURE — 84443 ASSAY THYROID STIM HORMONE: CPT | Performed by: INTERNAL MEDICINE

## 2018-12-04 PROCEDURE — 86334 IMMUNOFIX E-PHORESIS SERUM: CPT | Performed by: PATHOLOGY

## 2018-12-04 PROCEDURE — 85025 COMPLETE CBC W/AUTO DIFF WBC: CPT | Performed by: INTERNAL MEDICINE

## 2018-12-04 PROCEDURE — 84166 PROTEIN E-PHORESIS/URINE/CSF: CPT | Performed by: INTERNAL MEDICINE

## 2018-12-04 PROCEDURE — 84166 PROTEIN E-PHORESIS/URINE/CSF: CPT | Performed by: PATHOLOGY

## 2018-12-04 PROCEDURE — 84165 PROTEIN E-PHORESIS SERUM: CPT | Performed by: PATHOLOGY

## 2018-12-04 PROCEDURE — 84550 ASSAY OF BLOOD/URIC ACID: CPT | Performed by: INTERNAL MEDICINE

## 2018-12-04 PROCEDURE — 81001 URINALYSIS AUTO W/SCOPE: CPT | Performed by: INTERNAL MEDICINE

## 2018-12-04 PROCEDURE — 86334 IMMUNOFIX E-PHORESIS SERUM: CPT | Performed by: INTERNAL MEDICINE

## 2018-12-04 PROCEDURE — 84165 PROTEIN E-PHORESIS SERUM: CPT | Performed by: INTERNAL MEDICINE

## 2018-12-05 LAB
ALBUMIN UR ELPH-MCNC: 100 %
ALPHA1 GLOB MFR UR ELPH: 0 %
ALPHA2 GLOB MFR UR ELPH: 0 %
B-GLOBULIN MFR UR ELPH: 0 %
EST. AVERAGE GLUCOSE BLD GHB EST-MCNC: 143 MG/DL
GAMMA GLOB MFR UR ELPH: 0 %
HBA1C MFR BLD: 6.6 % (ref 4.2–6.3)
INTERPRETATION UR IFE-IMP: NORMAL
PROT PATTERN UR ELPH-IMP: NORMAL
PROT UR-MCNC: 8 MG/DL

## 2018-12-06 LAB
ALBUMIN SERPL ELPH-MCNC: 4.45 G/DL (ref 3.5–5)
ALBUMIN SERPL ELPH-MCNC: 60.1 % (ref 52–65)
ALPHA1 GLOB SERPL ELPH-MCNC: 0.33 G/DL (ref 0.1–0.4)
ALPHA1 GLOB SERPL ELPH-MCNC: 4.4 % (ref 2.5–5)
ALPHA2 GLOB SERPL ELPH-MCNC: 0.84 G/DL (ref 0.4–1.2)
ALPHA2 GLOB SERPL ELPH-MCNC: 11.3 % (ref 7–13)
BETA GLOB ABNORMAL SERPL ELPH-MCNC: 0.43 G/DL (ref 0.4–0.8)
BETA1 GLOB SERPL ELPH-MCNC: 5.8 % (ref 5–13)
BETA2 GLOB SERPL ELPH-MCNC: 4.7 % (ref 2–8)
BETA2+GAMMA GLOB SERPL ELPH-MCNC: 0.35 G/DL (ref 0.2–0.5)
GAMMA GLOB ABNORMAL SERPL ELPH-MCNC: 1.01 G/DL (ref 0.5–1.6)
GAMMA GLOB SERPL ELPH-MCNC: 13.7 % (ref 12–22)
IGG/ALB SER: 1.51 {RATIO} (ref 1.1–1.8)
M PROTEIN 1 MFR SERPL ELPH: 3.1 %
M PROTEIN 1 SERPL ELPH-MCNC: 0.23 G/DL
PROT PATTERN SERPL ELPH-IMP: NORMAL
PROT SERPL-MCNC: 7.4 G/DL (ref 6.4–8.2)

## 2019-03-14 DIAGNOSIS — I10 ESSENTIAL HYPERTENSION: ICD-10-CM

## 2019-03-15 RX ORDER — LISINOPRIL 20 MG/1
20 TABLET ORAL DAILY
Qty: 30 TABLET | Refills: 5 | Status: SHIPPED | OUTPATIENT
Start: 2019-03-15 | End: 2019-04-12 | Stop reason: SDUPTHER

## 2019-03-15 RX ORDER — TRIAMTERENE AND HYDROCHLOROTHIAZIDE 37.5; 25 MG/1; MG/1
1 CAPSULE ORAL DAILY
Qty: 30 CAPSULE | Refills: 5 | Status: SHIPPED | OUTPATIENT
Start: 2019-03-15 | End: 2019-06-03 | Stop reason: SDUPTHER

## 2019-03-15 RX ORDER — CLONIDINE HYDROCHLORIDE 0.1 MG/1
0.1 TABLET ORAL ONCE
Qty: 90 TABLET | Refills: 5 | Status: SHIPPED | OUTPATIENT
Start: 2019-03-15 | End: 2020-01-15 | Stop reason: SDUPTHER

## 2019-04-12 DIAGNOSIS — I10 ESSENTIAL HYPERTENSION: ICD-10-CM

## 2019-04-12 RX ORDER — LISINOPRIL 20 MG/1
20 TABLET ORAL DAILY
Qty: 90 TABLET | Refills: 3 | Status: SHIPPED | OUTPATIENT
Start: 2019-04-12 | End: 2020-01-15 | Stop reason: ALTCHOICE

## 2019-06-03 DIAGNOSIS — I10 ESSENTIAL HYPERTENSION: ICD-10-CM

## 2019-06-03 RX ORDER — TRIAMTERENE AND HYDROCHLOROTHIAZIDE 37.5; 25 MG/1; MG/1
1 CAPSULE ORAL DAILY
Qty: 30 CAPSULE | Refills: 5 | Status: SHIPPED | OUTPATIENT
Start: 2019-06-03 | End: 2020-01-15 | Stop reason: SDUPTHER

## 2019-06-04 RX ORDER — CLONIDINE HYDROCHLORIDE 0.1 MG/1
TABLET ORAL
Refills: 5 | COMMUNITY
Start: 2019-03-15 | End: 2020-06-09 | Stop reason: SDUPTHER

## 2019-06-05 ENCOUNTER — OFFICE VISIT (OUTPATIENT)
Dept: FAMILY MEDICINE CLINIC | Facility: CLINIC | Age: 84
End: 2019-06-05
Payer: MEDICARE

## 2019-06-05 VITALS
RESPIRATION RATE: 14 BRPM | SYSTOLIC BLOOD PRESSURE: 134 MMHG | HEIGHT: 63 IN | OXYGEN SATURATION: 97 % | HEART RATE: 81 BPM | WEIGHT: 178 LBS | DIASTOLIC BLOOD PRESSURE: 74 MMHG | BODY MASS INDEX: 31.54 KG/M2

## 2019-06-05 DIAGNOSIS — E11.8 TYPE 2 DIABETES MELLITUS WITH COMPLICATION, UNSPECIFIED WHETHER LONG TERM INSULIN USE: Primary | ICD-10-CM

## 2019-06-05 DIAGNOSIS — L97.509 ULCER OF FOOT, UNSPECIFIED LATERALITY, UNSPECIFIED ULCER STAGE (HCC): ICD-10-CM

## 2019-06-05 DIAGNOSIS — N18.30 TYPE 2 DIABETES MELLITUS WITH STAGE 3 CHRONIC KIDNEY DISEASE, WITHOUT LONG-TERM CURRENT USE OF INSULIN (HCC): Primary | ICD-10-CM

## 2019-06-05 DIAGNOSIS — Z00.00 MEDICARE ANNUAL WELLNESS VISIT, SUBSEQUENT: ICD-10-CM

## 2019-06-05 DIAGNOSIS — D47.2 MONOCLONAL GAMMOPATHY: ICD-10-CM

## 2019-06-05 DIAGNOSIS — E11.22 TYPE 2 DIABETES MELLITUS WITH STAGE 3 CHRONIC KIDNEY DISEASE, WITHOUT LONG-TERM CURRENT USE OF INSULIN (HCC): Primary | ICD-10-CM

## 2019-06-05 DIAGNOSIS — I10 ESSENTIAL HYPERTENSION: ICD-10-CM

## 2019-06-05 LAB — SL AMB POCT HEMOGLOBIN AIC: 6.4 (ref ?–6.5)

## 2019-06-05 PROCEDURE — G0439 PPPS, SUBSEQ VISIT: HCPCS | Performed by: FAMILY MEDICINE

## 2019-06-05 PROCEDURE — 83036 HEMOGLOBIN GLYCOSYLATED A1C: CPT

## 2019-11-26 ENCOUNTER — TELEPHONE (OUTPATIENT)
Dept: FAMILY MEDICINE CLINIC | Facility: CLINIC | Age: 84
End: 2019-11-26

## 2019-11-26 DIAGNOSIS — L03.90 CELLULITIS, UNSPECIFIED CELLULITIS SITE: Primary | ICD-10-CM

## 2019-11-26 RX ORDER — CEPHALEXIN 500 MG/1
500 CAPSULE ORAL EVERY 8 HOURS SCHEDULED
Qty: 21 CAPSULE | Refills: 0 | Status: SHIPPED | OUTPATIENT
Start: 2019-11-26 | End: 2019-12-03

## 2019-11-26 NOTE — TELEPHONE ENCOUNTER
Patient called stating that her left toe is red, swollen and has a blister, is requesting an antibiotic due to she is leaving for [place] tomorrow for the holiday  She did try to contact her Podiatrist but he is out of the office

## 2019-12-04 ENCOUNTER — OFFICE VISIT (OUTPATIENT)
Dept: FAMILY MEDICINE CLINIC | Facility: CLINIC | Age: 84
End: 2019-12-04

## 2019-12-04 VITALS
WEIGHT: 174 LBS | HEIGHT: 63 IN | BODY MASS INDEX: 30.83 KG/M2 | DIASTOLIC BLOOD PRESSURE: 68 MMHG | SYSTOLIC BLOOD PRESSURE: 116 MMHG

## 2019-12-04 DIAGNOSIS — D47.2 MONOCLONAL GAMMOPATHY: ICD-10-CM

## 2019-12-04 DIAGNOSIS — E11.22 TYPE 2 DIABETES MELLITUS WITH STAGE 4 CHRONIC KIDNEY DISEASE, WITHOUT LONG-TERM CURRENT USE OF INSULIN (HCC): Primary | ICD-10-CM

## 2019-12-04 DIAGNOSIS — E11.9 CONTROLLED TYPE 2 DIABETES MELLITUS WITHOUT COMPLICATION, WITHOUT LONG-TERM CURRENT USE OF INSULIN (HCC): ICD-10-CM

## 2019-12-04 DIAGNOSIS — Z79.4 TYPE 2 DIABETES MELLITUS WITH OTHER SPECIFIED COMPLICATION, WITH LONG-TERM CURRENT USE OF INSULIN (HCC): Primary | ICD-10-CM

## 2019-12-04 DIAGNOSIS — N18.4 TYPE 2 DIABETES MELLITUS WITH STAGE 4 CHRONIC KIDNEY DISEASE, WITHOUT LONG-TERM CURRENT USE OF INSULIN (HCC): Primary | ICD-10-CM

## 2019-12-04 DIAGNOSIS — I10 ESSENTIAL HYPERTENSION: ICD-10-CM

## 2019-12-04 DIAGNOSIS — M1A.3790 CHRONIC GOUT DUE TO RENAL IMPAIRMENT INVOLVING FOOT WITHOUT TOPHUS, UNSPECIFIED LATERALITY: ICD-10-CM

## 2019-12-04 DIAGNOSIS — M21.962 DEFORMITY OF METATARSAL BONE OF LEFT FOOT: ICD-10-CM

## 2019-12-04 DIAGNOSIS — L03.032 CELLULITIS OF TOE OF LEFT FOOT: ICD-10-CM

## 2019-12-04 DIAGNOSIS — E11.69 TYPE 2 DIABETES MELLITUS WITH OTHER SPECIFIED COMPLICATION, WITH LONG-TERM CURRENT USE OF INSULIN (HCC): Primary | ICD-10-CM

## 2019-12-04 DIAGNOSIS — E78.2 MIXED HYPERLIPIDEMIA: ICD-10-CM

## 2019-12-04 LAB — SL AMB POCT HEMOGLOBIN AIC: 6.1 (ref ?–6.5)

## 2019-12-04 PROCEDURE — 99214 OFFICE O/P EST MOD 30 MIN: CPT | Performed by: FAMILY MEDICINE

## 2019-12-04 PROCEDURE — 83036 HEMOGLOBIN GLYCOSYLATED A1C: CPT

## 2019-12-04 RX ORDER — AMOXICILLIN AND CLAVULANATE POTASSIUM 875; 125 MG/1; MG/1
1 TABLET, FILM COATED ORAL EVERY 12 HOURS SCHEDULED
Qty: 20 TABLET | Refills: 0 | Status: SHIPPED | OUTPATIENT
Start: 2019-12-04 | End: 2019-12-14

## 2019-12-04 RX ORDER — SULINDAC 200 MG/1
200 TABLET ORAL 2 TIMES DAILY
Qty: 180 TABLET | Refills: 3 | Status: SHIPPED | OUTPATIENT
Start: 2019-12-04 | End: 2021-06-21

## 2019-12-04 NOTE — PROGRESS NOTES
Assessment/Plan:    Diabetes mellitus type II, controlled ([facility] )    Lab Results   Component Value Date    HGBA1C 6 1 12/04/2019    Current A1c at goal   Continue current  Type 2 diabetes mellitus with stage 3 chronic kidney disease, without long-term current use of insulin ([facility])    Lab Results   Component Value Date    HGBA1C 6 1 12/04/2019        Diagnoses and all orders for this visit:    Type 2 diabetes mellitus with stage 4 chronic kidney disease, without long-term current use of insulin ([facility])    Essential hypertension  -     sulindac (CLINORIL) 200 MG tablet; Take 1 tablet (200 mg total) by mouth 2 (two) times a day    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, unspecified laterality    Mixed hyperlipidemia    Monoclonal gammopathy    Cellulitis of toe of left foot  -     amoxicillin-clavulanate (AUGMENTIN) 875-125 mg per tablet; Take 1 tablet by mouth every 12 (twelve) hours for 10 days    Controlled type 2 diabetes mellitus without complication, without long-term current use of insulin ([facility])          Subjective:      Patient ID: Alejandra Goss is a 80 y o  female  Her left 2nd toe has been red and ulcerated since just before Thanksgiving  She is having pain on that side  She sees a local podiatrist to as office hours tomorrow  We have placed a call to his office to get her in urgently  She has type 2 diabetes  Is under excellent control with an A1c of 6 1 percent in the office today  She has no hypoglycemia or side effects from her current regimen  Her blood pressure is under excellent control on her current regimen, which includes an Ace inhibitor  She has no cough  She has no headache or vision changes  She has no chest pain or shortness of breath  She has hyperlipidemia  She has tried every commercially available statin medication and has had side effects with all of from  She declines them going forward      She has stage 3 chronic kidney disease secondary to hypertension and type 2 diabetes mellitus  She is taking a nonsteroidal anti-inflammatory for arthritic pain  We have discussed that this is causing more issues with her kidneys, but she is unwilling to try anything else and this regimen is controlling her pain  She is happy with her current regimen  Her creatinine has been stable for several years however  The following portions of the patient's history were reviewed and updated as appropriate:   She  has no past medical history on file  She   Patient Active Problem List    Diagnosis Date Noted    Monoclonal gammopathy 06/05/2019    Type 2 diabetes mellitus with stage 3 chronic kidney disease, without long-term current use of insulin ([facility] ) 06/05/2019    Medicare annual wellness visit, subsequent 06/05/2019    Chronic gout of foot due to renal impairment 11/29/2018    Ulcer of foot ([facility] ) 11/07/2018    Arthralgia of toe 11/07/2018    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 11/07/2018    Cardiac murmur, previously undiagnosed 01/27/2018    DM type 2 causing CKD stage 4 ([facility] ) 06/26/2017    Urinary incontinence 06/26/2017    Diabetes mellitus type II, controlled ([place] [address] ) 08/24/2015    Hyperglycemia 08/26/2014    Esophageal reflux 08/15/2013    Allergic rhinitis 02/15/2013    Hyperlipidemia 02/13/2013    Hypertension 02/13/2013    Osteoarthritis 02/13/2013    Cortical senile cataract 12/16/2008     She  has a past surgical history that includes Appendectomy; Mastectomy; Cataract extraction; Colonoscopy; Hand surgery; Total abdominal hysterectomy w/ bilateral salpingoophorectomy; and Cholecystectomy  Her family history includes Diabetes in her father and mother; Heart disease in her mother; Other in her father  She  reports that she has never smoked  She has never used smokeless tobacco  She reports that she does not drink alcohol or use drugs    Current Outpatient Medications   Medication Sig Dispense Refill    cloNIDine (CATAPRES) 0 1 mg tablet   5    lisinopril (ZESTRIL) 20 mg tablet Take 1 tablet (20 mg total) by mouth daily 90 tablet 3    Multiple Vitamins-Minerals (TGT MULTIVITAMIN/MULTIMINERAL) TABS Take by mouth      Omega-3 Fatty Acids (FISH OIL) 1,000 mg Take 2 capsules (2,000 mg total) by mouth 2 (two) times a day 120 capsule 2    sulindac (CLINORIL) 200 MG tablet Take 1 tablet (200 mg total) by mouth 2 (two) times a day 180 tablet 3    triamterene-hydrochlorothiazide (DYAZIDE) 37 5-25 mg per capsule Take 1 capsule by mouth daily 30 capsule 5    acetaminophen (TYLENOL) 500 mg tablet Take 1 tablet (500 mg total) by mouth every 4 (four) hours as needed for mild pain (Patient not taking: Reported on 12/4/2019) 120 tablet 0    amoxicillin-clavulanate (AUGMENTIN) 875-125 mg per tablet Take 1 tablet by mouth every 12 (twelve) hours for 10 days 20 tablet 0    aspirin 81 mg chewable tablet Chew      cephalexin (KEFLEX) 250 mg capsule   0    cloNIDine (CATAPRES) 0 1 mg tablet Take 1 tablet (0 1 mg total) by mouth once for 1 dose 90 tablet 5    fluticasone (FLONASE) 50 mcg/act nasal spray into each nostril      latanoprost (XALATAN) 0 005 % ophthalmic solution Apply to eye      lysine 500 MG TABS Take by mouth       No current facility-administered medications for this visit        Current Outpatient Medications on File Prior to Visit   Medication Sig    cloNIDine (CATAPRES) 0 1 mg tablet     lisinopril (ZESTRIL) 20 mg tablet Take 1 tablet (20 mg total) by mouth daily    Multiple Vitamins-Minerals (TGT MULTIVITAMIN/MULTIMINERAL) TABS Take by mouth    Omega-3 Fatty Acids (FISH OIL) 1,000 mg Take 2 capsules (2,000 mg total) by mouth 2 (two) times a day    triamterene-hydrochlorothiazide (DYAZIDE) 37 5-25 mg per capsule Take 1 capsule by mouth daily    [DISCONTINUED] sulindac (CLINORIL) 200 MG tablet Take 1 tablet (200 mg total) by mouth 2 (two) times a day    acetaminophen (TYLENOL) 500 mg tablet Take 1 tablet (500 mg total) by mouth every 4 (four) hours as needed for mild pain (Patient not taking: Reported on 2019)    aspirin 81 mg chewable tablet Chew    cephalexin (KEFLEX) 250 mg capsule     [] cephalexin (KEFLEX) 500 mg capsule Take 1 capsule (500 mg total) by mouth every 8 (eight) hours for 7 days    cloNIDine (CATAPRES) 0 1 mg tablet Take 1 tablet (0 1 mg total) by mouth once for 1 dose    fluticasone (FLONASE) 50 mcg/act nasal spray into each nostril    latanoprost (XALATAN) 0 005 % ophthalmic solution Apply to eye    lysine 500 MG TABS Take by mouth    [DISCONTINUED] ergocalciferol (VITAMIN D2) 50,000 units Take 1 capsule (50,000 Units total) by mouth once a week (Patient not taking: Reported on 2019)     No current facility-administered medications on file prior to visit  She is allergic to atorvastatin; morphine and related; pravastatin; and tetanus toxoid       Review of Systems   All other systems reviewed and are negative  Objective:      /68   Ht 5' 3" (1 6 m)   Wt 78 9 kg (174 lb)   BMI 30 82 kg/m²          Physical Exam   Constitutional: She is oriented to person, place, and time  She appears well-developed and well-nourished  Neck: Normal range of motion  Neck supple  Cardiovascular: Normal rate, regular rhythm, normal heart sounds and intact distal pulses  Pulmonary/Chest: Effort normal and breath sounds normal    Abdominal: Soft  Bowel sounds are normal    Musculoskeletal: Normal range of motion  Neurological: She is alert and oriented to person, place, and time  Skin: Skin is warm and dry  Capillary refill takes less than 2 seconds  Psychiatric: She has a normal mood and affect  Her behavior is normal  Judgment and thought content normal    Nursing note and vitals reviewed

## 2019-12-04 NOTE — ASSESSMENT & PLAN NOTE
Lab Results   Component Value Date    HGBA1C 6 1 12/04/2019    Current A1c at goal   Continue current

## 2019-12-04 NOTE — PROGRESS NOTES
Diabetic Foot Exam    Patient's shoes and socks removed  Right Foot/Ankle   Right Foot Inspection  Skin Exam: skin normal and skin intact no dry skin, no warmth, no callus, no erythema, no maceration, no abnormal color, no pre-ulcer, no ulcer and no callus                          Toe Exam: ROM and strength within normal limits  Sensory   Vibration: diminished  Proprioception: diminished   Monofilament testing: diminished  Vascular  Capillary refills: elevated  The right DP pulse is 1+  The right PT pulse is 1+  Left Foot/Ankle  Left Foot Inspection  Skin Exam: skin normal and skin intactno dry skin, no warmth, no erythema, no maceration, normal color, no pre-ulcer, no ulcer and no callus                         Toe Exam: ROM and strength within normal limits                   Sensory   Vibration: diminished  Proprioception: diminished  Monofilament: diminished  Vascular  Capillary refills: elevated  The left DP pulse is 1+  The left PT pulse is 1+  Assign Risk Category:  No deformity present;  Loss of protective sensation; Weak pulses       Risk: 2

## 2020-01-08 ENCOUNTER — TRANSITIONAL CARE MANAGEMENT (OUTPATIENT)
Dept: FAMILY MEDICINE CLINIC | Facility: CLINIC | Age: 85
End: 2020-01-08

## 2020-01-15 ENCOUNTER — OFFICE VISIT (OUTPATIENT)
Dept: FAMILY MEDICINE CLINIC | Facility: CLINIC | Age: 85
End: 2020-01-15
Payer: MEDICARE

## 2020-01-15 VITALS
HEIGHT: 63 IN | RESPIRATION RATE: 18 BRPM | WEIGHT: 170.2 LBS | OXYGEN SATURATION: 96 % | BODY MASS INDEX: 30.16 KG/M2 | TEMPERATURE: 97.8 F | HEART RATE: 89 BPM | SYSTOLIC BLOOD PRESSURE: 114 MMHG | DIASTOLIC BLOOD PRESSURE: 76 MMHG

## 2020-01-15 DIAGNOSIS — Z09 HOSPITAL DISCHARGE FOLLOW-UP: Primary | ICD-10-CM

## 2020-01-15 DIAGNOSIS — M86.9 TOE OSTEOMYELITIS (HCC): ICD-10-CM

## 2020-01-15 DIAGNOSIS — I10 ESSENTIAL HYPERTENSION: ICD-10-CM

## 2020-01-15 DIAGNOSIS — S32.591S: ICD-10-CM

## 2020-01-15 PROBLEM — N18.30 STAGE 3 CHRONIC KIDNEY DISEASE (HCC): Status: ACTIVE | Noted: 2019-12-20

## 2020-01-15 PROBLEM — S72.009A HIP FRACTURE (HCC): Status: ACTIVE | Noted: 2019-12-24

## 2020-01-15 PROCEDURE — 99495 TRANSJ CARE MGMT MOD F2F 14D: CPT | Performed by: NURSE PRACTITIONER

## 2020-01-15 RX ORDER — TRIAMTERENE AND HYDROCHLOROTHIAZIDE 37.5; 25 MG/1; MG/1
1 CAPSULE ORAL DAILY
Qty: 90 CAPSULE | Refills: 1 | Status: SHIPPED | OUTPATIENT
Start: 2020-01-15 | End: 2020-06-09 | Stop reason: SDUPTHER

## 2020-01-15 NOTE — PATIENT INSTRUCTIONS
Wellness Visit for Adults   WHAT YOU NEED TO KNOW:   What is a wellness visit? A wellness visit is when you see your healthcare provider to get screened for health problems  You can also get advice on how to stay healthy  Write down your questions so you remember to ask them  Ask your healthcare provider how often you should have a wellness visit  What happens at a wellness visit? Your healthcare provider will ask about your health, and your family history of health problems  This includes high blood pressure, heart disease, and cancer  He or she will ask if you have symptoms that concern you, if you smoke, and about your mood  You may also be asked about your intake of medicines, supplements, food, and alcohol  Any of the following may be done:  · Your weight  will be checked  Your height may also be checked so your body mass index (BMI) can be calculated  Your BMI shows if you are at a healthy weight  · Your blood pressure  and heart rate will be checked  Your temperature may also be checked  · Blood and urine tests  may be done  Blood tests may be done to check your cholesterol levels  Abnormal cholesterol levels increase your risk for heart disease and stroke  You may also need a blood or urine test to check for diabetes if you are at increased risk  Urine tests may be done to look for signs of an infection or kidney disease  · A physical exam  includes checking your heartbeat and lungs with a stethoscope  Your healthcare provider may also check your skin to look for sun damage  · Screening tests  may be recommended  A screening test is done to check for diseases that may not cause symptoms  The screening tests you may need depend on your age, gender, family history, and lifestyle habits  For example, colorectal screening may be recommended if you are 48years old or older  What screening tests do I need if I am a woman? · A Pap smear  is used to screen for cervical cancer   Pap smears are usually done every 3 to 5 years depending on your age  You may need them more often if you have had abnormal Pap smear test results in the past  Ask your healthcare provider how often you should have a Pap smear  · A mammogram  is an x-ray of your breasts to screen for breast cancer  Experts recommend mammograms every 2 years starting at age 48 years  You may need a mammogram at age 52 years or younger if you have an increased risk for breast cancer  Talk to your healthcare provider about when you should start having mammograms and how often you need them  What vaccines might I need? · Get an influenza vaccine  every year  The influenza vaccine protects you from the flu  Several types of viruses cause the flu  The viruses change over time, so new vaccines are made each year  · Get a tetanus-diphtheria (Td) booster vaccine  every 10 years  This vaccine protects you against tetanus and diphtheria  Tetanus is a severe infection that may cause painful muscle spasms and lockjaw  Diphtheria is a severe bacterial infection that causes a thick covering in the back of your mouth and throat  · Get a human papillomavirus (HPV) vaccine  if you are female and aged 23 to 32 or male 23 to 24 and never received it  This vaccine protects you from HPV infection  HPV is the most common infection spread by sexual contact  HPV may also cause vaginal, penile, and anal cancers  · Get a pneumococcal vaccine  if you are aged 72 years or older  The pneumococcal vaccine is an injection given to protect you from pneumococcal disease  Pneumococcal disease is an infection caused by pneumococcal bacteria  The infection may cause pneumonia, meningitis, or an ear infection  · Get a shingles vaccine  if you are aged 61 or older, even if you have had shingles before  The shingles vaccine is an injection to protect you from the varicella-zoster virus  This is the same virus that causes chickenpox   Shingles is a painful rash that develops in people who had chickenpox or have been exposed to the virus  How can I eat healthy? My Plate is a model for planning healthy meals  It shows the types and amounts of foods that should go on your plate  Fruits and vegetables make up about half of your plate, and grains and protein make up the other half  A serving of dairy is included on the side of your plate  The amount of calories and serving sizes you need depends on your age, gender, weight, and height  Examples of healthy foods are listed below:  · Eat a variety of vegetables  such as dark green, red, and orange vegetables  You can also include canned vegetables low in sodium (salt) and frozen vegetables without added butter or sauces  · Eat a variety of fresh fruits , canned fruit in 100% juice, frozen fruit, and dried fruit  · Include whole grains  At least half of the grains you eat should be whole grains  Examples include whole-wheat bread, wheat pasta, brown rice, and whole-grain cereals such as oatmeal     · Eat a variety of protein foods such as seafood (fish and shellfish), lean meat, and poultry without skin (turkey and chicken)  Examples of lean meats include pork leg, shoulder, or tenderloin, and beef round, sirloin, tenderloin, and extra lean ground beef  Other protein foods include eggs and egg substitutes, beans, peas, soy products, nuts, and seeds  · Choose low-fat dairy products such as skim or 1% milk or low-fat yogurt, cheese, and cottage cheese  · Limit unhealthy fats  such as butter, hard margarine, and shortening  How much exercise do I need? Exercise at least 30 minutes per day on most days of the week  Some examples of exercise include walking, biking, dancing, and swimming  You can also fit in more physical activity by taking the stairs instead of the elevator or parking farther away from stores  Include muscle strengthening activities 2 days each week   Regular exercise provides many health benefits  It helps you manage your weight, and decreases your risk for type 2 diabetes, heart disease, stroke, and high blood pressure  Exercise can also help improve your mood  Ask your healthcare provider about the best exercise plan for you  What are some general health and safety guidelines I should follow? · Do not smoke  Nicotine and other chemicals in cigarettes and cigars can cause lung damage  Ask your healthcare provider for information if you currently smoke and need help to quit  E-cigarettes or smokeless tobacco still contain nicotine  Talk to your healthcare provider before you use these products  · Limit alcohol  A drink of alcohol is 12 ounces of beer, 5 ounces of wine, or 1½ ounces of liquor  · Lose weight, if needed  Being overweight increases your risk of certain health conditions  These include heart disease, high blood pressure, type 2 diabetes, and certain types of cancer  · Protect your skin  Do not sunbathe or use tanning beds  Use sunscreen with a SPF 15 or higher  Apply sunscreen at least 15 minutes before you go outside  Reapply sunscreen every 2 hours  Wear protective clothing, hats, and sunglasses when you are outside  · Drive safely  Always wear your seatbelt  Make sure everyone in your car wears a seatbelt  A seatbelt can save your life if you are in an accident  Do not use your cell phone when you are driving  This could distract you and cause an accident  Pull over if you need to make a call or send a text message  · Practice safe sex  Use latex condoms if are sexually active and have more than one partner  Your healthcare provider may recommend screening tests for sexually transmitted infections (STIs)  · Wear helmets, lifejackets, and protective gear  Always wear a helmet when you ride a bike or motorcycle, go skiing, or play sports that could cause a head injury  Wear protective equipment when you play sports   Wear a lifejacket when you are on a boat or doing water sports  CARE AGREEMENT:   You have the right to help plan your care  Learn about your health condition and how it may be treated  Discuss treatment options with your caregivers to decide what care you want to receive  You always have the right to refuse treatment  The above information is an  only  It is not intended as medical advice for individual conditions or treatments  Talk to your doctor, nurse or pharmacist before following any medical regimen to see if it is safe and effective for you  © 2017 [employer] Information is for End User's use only and may not be sold, redistributed or otherwise used for commercial purposes  All illustrations and images included in CareNotes® are the copyrighted property of A D A M [employer]  or Russell [employer]

## 2020-01-15 NOTE — PROGRESS NOTES
Assessment/Plan:     Diagnoses and all orders for this visit:    Hospital discharge follow-up    Toe osteomyelitis ([address] )  Comments: Followed by Wound Care    Closed fracture of multiple rami of right pubis, sequela  Comments:  Currently asymptomatic; patient refuses PT    Essential hypertension  -     triamterene-hydrochlorothiazide (DYAZIDE) 37 5-25 mg per capsule; Take 1 capsule by mouth daily        Subjective:      Patient ID: Annelise Kim is a 80 y o  female  Patient presents to [address] [place] [address] as follow up after hospitalization  Allergies, medical history and current medications reviewed with patient; patient reports taking all medications as prescribed without issues  Patient was admitted to [address] [place] [address] on 12/20/19 after a fall, where she was found to have a closed fracture of multiple right pubic rami  Patient states she fell while being fitted for a surgical boot  Patient had multiple consultations during admission, including Orthopedics, ID and General Surgery  Patient was ultimately discharged to [address] [place] [address], from which she was discharged on 1/7/20  Today, patient C/O right-sided knee pain, which she attributes to known Arthritis  Patient denies any hip pain; patient refused outpatient PT  Patient continues to follow with General Surgeon Dr Yarelis Pollack for left foot Osteomyelitis, who she last saw yesterday  Patient reports he next appointment is scheduled for 1/21/20  Patient denies any current problems or complaints, and states she has been able to go up and down the stairs at home without difficulty  Patient states they had stopped her Lisinopril while in the hospital, and that she has not restarted the medication since being discharged       TCM Call (since 12/15/2019)     Date and time call was made  1/8/2020  6:17 PM    Patient was hospitialized at  [facility] (comment)    [address] [place] [address]     Date of Admission  12/20/19    Date of discharge  01/07/20 Diagnosis  Closed fracture of multiple pubic rami, right,     Disposition  Rehabilitation center; Home      TCM Call (since 12/15/2019)     Scheduled for follow up? Yes    Is transportation to your appointment needed  Yes    Have you fallen in the last 12 months  Yes    Interperter language line needed  No     Patient Care Team:  Mao Sow MD as PCP - [address] [place] [address] DO MARGI (Cardiology)  Laura Sandoval DO (General Surgery)    Review of Systems   Musculoskeletal: Positive for arthralgias  Skin: Positive for wound  All other systems reviewed and are negative  Objective:    /76 (BP Location: Right arm, Patient Position: Sitting, Cuff Size: Large)   Pulse 89   Temp 97 8 °F (36 6 °C) (Temporal)   Resp 18   Ht 5' 3" (1 6 m)   Wt 77 2 kg (170 lb 3 2 oz)   SpO2 96%   BMI 30 15 kg/m²      Physical Exam   Constitutional: She is oriented to person, place, and time  She appears well-developed and well-nourished  No distress  HENT:   Head: Normocephalic and atraumatic  Eyes: Conjunctivae and lids are normal    Neck: Normal range of motion  No tracheal deviation present  Cardiovascular: Normal rate, regular rhythm, S1 normal and S2 normal    Murmur heard  Pulmonary/Chest: Effort normal and breath sounds normal  No respiratory distress  Abdominal: Normal appearance  She exhibits no distension  There is no guarding  Musculoskeletal:        Left ankle: She exhibits decreased range of motion (boot in place)  Neurological: She is alert and oriented to person, place, and time  Skin: Skin is warm, dry and intact  Psychiatric: She has a normal mood and affect  Her speech is normal    Nursing note and vitals reviewed  BMI Counseling: Body mass index is 30 15 kg/m²  The BMI is above normal  Nutrition recommendations include encouraging healthy choices of fruits and vegetables, consuming healthier snacks, reducing intake of saturated and trans fat and reducing intake of cholesterol  Exercise recommendations include exercising 3-5 times per week  The above recommendations were included in patient instructions

## 2020-01-31 NOTE — TELEPHONE ENCOUNTER
Uric acid high @ 8 5 mg/dL  Plan:   Xray of bilateral feet to R/O gout  Patient already referred to Podiatry    Vitamin D low @ 13 8 ng/mL  Plan:  Start Vitamin D2 50,000 units (1 capsule) PO once a week    TSH elevated @ 4 3 U/mL  Plan:  Repeat TSH in 3 months    Triglycerides high @ 229 mg/dL  LDL high @ 154  Total cholesterol high @ 257  Plan:  Start Fish Oil 2,000 mg PO twice daily    Potassium high @ 5 5 mmol/L  BUN high @ 64 mg/dL  Creatinine elevated @ 1 69 mg/dL  Plan:  Consult urology    General Plan:  Repeat all labs in 6 months  F/U in office in 3 months    -------------------------------------------------      Discussed labs and plan with patient, who verbalized understanding and agreement with the plan 
Bronchodilator Assessment    FEV1 % PREDICTED   FEV1 actual:   PEFR    PEFR % Predicted   RR 18 1  Bronchodilator assessment at level  1  BRONCHODILATOR ASSESSMENT SCORE  Score 1 2 3 4   Breath Sounds   []  Clear []  Mild Wheezing with good aeration []  Moderate I/E wheezing with adequate aeration []  Poor Aeration or diffuse wheezing   Respiratory Rate []  Less than 20 []  20-25 []  Greater than 25  []  Greater than 35    Dyspnea []  No SOB  []  SOB with minimal activity []  Speaking in partial sentences []  Acute/ At rest   Peakflow (asthma) []  80 % or greater predicted/PB  []  Unable []  70% or greater predicted/PB  []  Unable []  51%-70% predicted/PB  []  Unable []  Less than 50% predicted/PB  []  Unable due to distress   FEV1 % Predicted []  Greater than 69%  []  Unable  []  Less than 50%-69%  []  Unable  []  Less than 35%-49%  []  Unable  []  Less than 35%  []  Unable due to distress     MDI Instruction yes

## 2020-06-09 ENCOUNTER — OFFICE VISIT (OUTPATIENT)
Dept: FAMILY MEDICINE CLINIC | Facility: CLINIC | Age: 85
End: 2020-06-09
Payer: MEDICARE

## 2020-06-09 VITALS
SYSTOLIC BLOOD PRESSURE: 138 MMHG | WEIGHT: 175 LBS | TEMPERATURE: 98.4 F | BODY MASS INDEX: 31.01 KG/M2 | OXYGEN SATURATION: 96 % | HEIGHT: 63 IN | HEART RATE: 87 BPM | DIASTOLIC BLOOD PRESSURE: 58 MMHG

## 2020-06-09 DIAGNOSIS — E11.22 TYPE 2 DIABETES MELLITUS WITH STAGE 3 CHRONIC KIDNEY DISEASE, WITHOUT LONG-TERM CURRENT USE OF INSULIN (HCC): ICD-10-CM

## 2020-06-09 DIAGNOSIS — Z00.00 MEDICARE ANNUAL WELLNESS VISIT, SUBSEQUENT: Primary | ICD-10-CM

## 2020-06-09 DIAGNOSIS — D47.2 MONOCLONAL GAMMOPATHY: ICD-10-CM

## 2020-06-09 DIAGNOSIS — I10 ESSENTIAL HYPERTENSION: ICD-10-CM

## 2020-06-09 DIAGNOSIS — E78.2 MIXED HYPERLIPIDEMIA: ICD-10-CM

## 2020-06-09 DIAGNOSIS — N18.30 TYPE 2 DIABETES MELLITUS WITH STAGE 3 CHRONIC KIDNEY DISEASE, WITHOUT LONG-TERM CURRENT USE OF INSULIN (HCC): ICD-10-CM

## 2020-06-09 LAB — SL AMB POCT HEMOGLOBIN AIC: 6.5 (ref ?–6.5)

## 2020-06-09 PROCEDURE — 1125F AMNT PAIN NOTED PAIN PRSNT: CPT | Performed by: FAMILY MEDICINE

## 2020-06-09 PROCEDURE — 3078F DIAST BP <80 MM HG: CPT | Performed by: FAMILY MEDICINE

## 2020-06-09 PROCEDURE — G0439 PPPS, SUBSEQ VISIT: HCPCS | Performed by: FAMILY MEDICINE

## 2020-06-09 PROCEDURE — 1036F TOBACCO NON-USER: CPT | Performed by: FAMILY MEDICINE

## 2020-06-09 PROCEDURE — 83036 HEMOGLOBIN GLYCOSYLATED A1C: CPT | Performed by: FAMILY MEDICINE

## 2020-06-09 PROCEDURE — 3044F HG A1C LEVEL LT 7.0%: CPT | Performed by: FAMILY MEDICINE

## 2020-06-09 PROCEDURE — 3066F NEPHROPATHY DOC TX: CPT | Performed by: FAMILY MEDICINE

## 2020-06-09 PROCEDURE — 3008F BODY MASS INDEX DOCD: CPT | Performed by: FAMILY MEDICINE

## 2020-06-09 PROCEDURE — 1170F FXNL STATUS ASSESSED: CPT | Performed by: FAMILY MEDICINE

## 2020-06-09 PROCEDURE — 4040F PNEUMOC VAC/ADMIN/RCVD: CPT | Performed by: FAMILY MEDICINE

## 2020-06-09 PROCEDURE — 99214 OFFICE O/P EST MOD 30 MIN: CPT | Performed by: FAMILY MEDICINE

## 2020-06-09 PROCEDURE — 3075F SYST BP GE 130 - 139MM HG: CPT | Performed by: FAMILY MEDICINE

## 2020-06-09 RX ORDER — TRIAMTERENE AND HYDROCHLOROTHIAZIDE 37.5; 25 MG/1; MG/1
1 CAPSULE ORAL DAILY
Qty: 90 CAPSULE | Refills: 1 | Status: SHIPPED | OUTPATIENT
Start: 2020-06-09 | End: 2022-06-30

## 2020-06-09 RX ORDER — SULFAMETHOXAZOLE AND TRIMETHOPRIM 800; 160 MG/1; MG/1
TABLET ORAL
COMMUNITY
Start: 2020-04-01 | End: 2021-06-21

## 2020-06-09 RX ORDER — CLONIDINE HYDROCHLORIDE 0.1 MG/1
0.1 TABLET ORAL ONCE
Qty: 90 TABLET | Refills: 3 | Status: SHIPPED | OUTPATIENT
Start: 2020-06-09 | End: 2021-12-27

## 2020-09-08 ENCOUNTER — TELEPHONE (OUTPATIENT)
Dept: FAMILY MEDICINE CLINIC | Facility: CLINIC | Age: 85
End: 2020-09-08

## 2020-09-08 NOTE — TELEPHONE ENCOUNTER
Patient called said she cant eat anything, she's weak (fell into her counter) " im hot one minute and cold the next, my throat is sore" patient states she has been like this for 4-5 days  asking if she needs to get tested or if something can be called in

## 2020-11-03 ENCOUNTER — OFFICE VISIT (OUTPATIENT)
Dept: FAMILY MEDICINE CLINIC | Facility: CLINIC | Age: 85
End: 2020-11-03
Payer: MEDICARE

## 2020-11-03 VITALS
HEART RATE: 89 BPM | WEIGHT: 175 LBS | OXYGEN SATURATION: 95 % | SYSTOLIC BLOOD PRESSURE: 132 MMHG | DIASTOLIC BLOOD PRESSURE: 72 MMHG | HEIGHT: 63 IN | TEMPERATURE: 98.5 F | BODY MASS INDEX: 31.01 KG/M2

## 2020-11-03 DIAGNOSIS — E11.22 TYPE 2 DIABETES MELLITUS WITH STAGE 3A CHRONIC KIDNEY DISEASE, WITHOUT LONG-TERM CURRENT USE OF INSULIN (HCC): Primary | ICD-10-CM

## 2020-11-03 DIAGNOSIS — N18.31 TYPE 2 DIABETES MELLITUS WITH STAGE 3A CHRONIC KIDNEY DISEASE, WITHOUT LONG-TERM CURRENT USE OF INSULIN (HCC): Primary | ICD-10-CM

## 2020-11-03 DIAGNOSIS — M19.90 ARTHRITIS: Primary | ICD-10-CM

## 2020-11-03 DIAGNOSIS — E78.2 MIXED HYPERLIPIDEMIA: ICD-10-CM

## 2020-11-03 DIAGNOSIS — I10 ESSENTIAL HYPERTENSION: ICD-10-CM

## 2020-11-03 DIAGNOSIS — L97.522 ULCER OF LEFT FOOT, WITH FAT LAYER EXPOSED (HCC): ICD-10-CM

## 2020-11-03 DIAGNOSIS — E11.22 TYPE 2 DIABETES MELLITUS WITH STAGE 4 CHRONIC KIDNEY DISEASE, WITHOUT LONG-TERM CURRENT USE OF INSULIN (HCC): ICD-10-CM

## 2020-11-03 DIAGNOSIS — N18.4 TYPE 2 DIABETES MELLITUS WITH STAGE 4 CHRONIC KIDNEY DISEASE, WITHOUT LONG-TERM CURRENT USE OF INSULIN (HCC): ICD-10-CM

## 2020-11-03 LAB — SL AMB POCT HEMOGLOBIN AIC: 6.4 (ref ?–6.5)

## 2020-11-03 PROCEDURE — 99214 OFFICE O/P EST MOD 30 MIN: CPT | Performed by: FAMILY MEDICINE

## 2020-11-03 PROCEDURE — 83036 HEMOGLOBIN GLYCOSYLATED A1C: CPT

## 2020-11-03 RX ORDER — CARVEDILOL 6.25 MG/1
6.25 TABLET ORAL 2 TIMES DAILY WITH MEALS
COMMUNITY
Start: 2020-10-20 | End: 2021-12-27 | Stop reason: SDUPTHER

## 2020-11-03 RX ORDER — AMLODIPINE BESYLATE 10 MG/1
10 TABLET ORAL DAILY
COMMUNITY
Start: 2020-10-20 | End: 2021-12-27 | Stop reason: SDUPTHER

## 2020-11-03 RX ORDER — PANTOPRAZOLE SODIUM 40 MG/1
40 TABLET, DELAYED RELEASE ORAL
COMMUNITY
Start: 2020-09-11 | End: 2021-09-11

## 2021-06-21 ENCOUNTER — OFFICE VISIT (OUTPATIENT)
Dept: FAMILY MEDICINE CLINIC | Facility: CLINIC | Age: 86
End: 2021-06-21
Payer: MEDICARE

## 2021-06-21 VITALS
BODY MASS INDEX: 30.12 KG/M2 | WEIGHT: 170 LBS | SYSTOLIC BLOOD PRESSURE: 124 MMHG | HEART RATE: 84 BPM | TEMPERATURE: 98 F | OXYGEN SATURATION: 95 % | DIASTOLIC BLOOD PRESSURE: 68 MMHG | HEIGHT: 63 IN

## 2021-06-21 DIAGNOSIS — E11.22 TYPE 2 DIABETES MELLITUS WITH STAGE 4 CHRONIC KIDNEY DISEASE, WITHOUT LONG-TERM CURRENT USE OF INSULIN (HCC): ICD-10-CM

## 2021-06-21 DIAGNOSIS — I10 ESSENTIAL HYPERTENSION: Primary | ICD-10-CM

## 2021-06-21 DIAGNOSIS — D51.9 ANEMIA DUE TO VITAMIN B12 DEFICIENCY, UNSPECIFIED B12 DEFICIENCY TYPE: ICD-10-CM

## 2021-06-21 DIAGNOSIS — N18.4 TYPE 2 DIABETES MELLITUS WITH STAGE 4 CHRONIC KIDNEY DISEASE, WITHOUT LONG-TERM CURRENT USE OF INSULIN (HCC): ICD-10-CM

## 2021-06-21 DIAGNOSIS — L97.522 ULCER OF LEFT FOOT, WITH FAT LAYER EXPOSED (HCC): ICD-10-CM

## 2021-06-21 DIAGNOSIS — M19.90 ARTHRITIS: ICD-10-CM

## 2021-06-21 DIAGNOSIS — R29.6 FREQUENT FALLS: ICD-10-CM

## 2021-06-21 DIAGNOSIS — M86.9 TOE OSTEOMYELITIS (HCC): ICD-10-CM

## 2021-06-21 DIAGNOSIS — D47.2 MONOCLONAL GAMMOPATHY: ICD-10-CM

## 2021-06-21 PROBLEM — L97.509 ULCER OF FOOT (HCC): Status: RESOLVED | Noted: 2018-11-07 | Resolved: 2021-06-21

## 2021-06-21 LAB — SL AMB POCT HEMOGLOBIN AIC: 6.8 (ref ?–6.5)

## 2021-06-21 PROCEDURE — 83036 HEMOGLOBIN GLYCOSYLATED A1C: CPT | Performed by: FAMILY MEDICINE

## 2021-06-21 PROCEDURE — 99214 OFFICE O/P EST MOD 30 MIN: CPT | Performed by: FAMILY MEDICINE

## 2021-06-21 PROCEDURE — 1123F ACP DISCUSS/DSCN MKR DOCD: CPT | Performed by: FAMILY MEDICINE

## 2021-06-21 PROCEDURE — G0439 PPPS, SUBSEQ VISIT: HCPCS | Performed by: FAMILY MEDICINE

## 2021-06-21 NOTE — PATIENT INSTRUCTIONS
Medicare Preventive Visit Patient Instructions  Thank you for completing your Welcome to Medicare Visit or Medicare Annual Wellness Visit today  Your next wellness visit will be due in one year (6/22/2022)  The screening/preventive services that you may require over the next 5-10 years are detailed below  Some tests may not apply to you based off risk factors and/or age  Screening tests ordered at today's visit but not completed yet may show as past due  Also, please note that scanned in results may not display below  Preventive Screenings:  Service Recommendations Previous Testing/Comments   Colorectal Cancer Screening  * Colonoscopy    * Fecal Occult Blood Test (FOBT)/Fecal Immunochemical Test (FIT)  * Fecal DNA/Cologuard Test  * Flexible Sigmoidoscopy Age: 54-65 years old   Colonoscopy: every 10 years (may be performed more frequently if at higher risk)  OR  FOBT/FIT: every 1 year  OR  Cologuard: every 3 years  OR  Sigmoidoscopy: every 5 years  Screening may be recommended earlier than age 48 if at higher risk for colorectal cancer  Also, an individualized decision between you and your healthcare provider will decide whether screening between the ages of 74-80 would be appropriate  Colonoscopy: 08/18/2011  FOBT/FIT: Not on file  Cologuard: Not on file  Sigmoidoscopy: Not on file    Screening Not Indicated     Breast Cancer Screening Age: 36 years old  Frequency: every 1-2 years  Not required if history of left and right mastectomy Mammogram: Not on file        Cervical Cancer Screening Between the ages of 21-29, pap smear recommended once every 3 years  Between the ages of 33-67, can perform pap smear with HPV co-testing every 5 years     Recommendations may differ for women with a history of total hysterectomy, cervical cancer, or abnormal pap smears in past  Pap Smear: Not on file    Screening Not Indicated   Hepatitis C Screening Once for adults born between 1945 and 1965  More frequently in patients at high risk for Hepatitis C Hep C Antibody: Not on file        Diabetes Screening 1-2 times per year if you're at risk for diabetes or have pre-diabetes Fasting glucose: 124 mg/dL   A1C: 6 4    Screening Not Indicated  History Diabetes   Cholesterol Screening Once every 5 years if you don't have a lipid disorder  May order more often based on risk factors  Lipid panel: 09/09/2020    Screening Not Indicated  History Lipid Disorder     Other Preventive Screenings Covered by Medicare:  1  Abdominal Aortic Aneurysm (AAA) Screening: covered once if your at risk  You're considered to be at risk if you have a family history of AAA  2  Lung Cancer Screening: covers low dose CT scan once per year if you meet all of the following conditions: (1) Age 50-69; (2) No signs or symptoms of lung cancer; (3) Current smoker or have quit smoking within the last 15 years; (4) You have a tobacco smoking history of at least 30 pack years (packs per day multiplied by number of years you smoked); (5) You get a written order from a healthcare provider  3  Glaucoma Screening: covered annually if you're considered high risk: (1) You have diabetes OR (2) Family history of glaucoma OR (3)  aged 48 and older OR (3)  American aged 72 and older  3  Osteoporosis Screening: covered every 2 years if you meet one of the following conditions: (1) You're estrogen deficient and at risk for osteoporosis based off medical history and other findings; (2) Have a vertebral abnormality; (3) On glucocorticoid therapy for more than 3 months; (4) Have primary hyperparathyroidism; (5) On osteoporosis medications and need to assess response to drug therapy  · Last bone density test (DXA Scan): Not on file  5  HIV Screening: covered annually if you're between the age of 12-76  Also covered annually if you are younger than 13 and older than 72 with risk factors for HIV infection   For pregnant patients, it is covered up to 3 times per pregnancy  Immunizations:  Immunization Recommendations   Influenza Vaccine Annual influenza vaccination during flu season is recommended for all persons aged >= 6 months who do not have contraindications   Pneumococcal Vaccine (Prevnar and Pneumovax)  * Prevnar = PCV13  * Pneumovax = PPSV23   Adults 25-60 years old: 1-3 doses may be recommended based on certain risk factors  Adults 72 years old: Prevnar (PCV13) vaccine recommended followed by Pneumovax (PPSV23) vaccine  If already received PPSV23 since turning 65, then PCV13 recommended at least one year after PPSV23 dose  Hepatitis B Vaccine 3 dose series if at intermediate or high risk (ex: diabetes, end stage renal disease, liver disease)   Tetanus (Td) Vaccine - COST NOT COVERED BY MEDICARE PART B Following completion of primary series, a booster dose should be given every 10 years to maintain immunity against tetanus  Td may also be given as tetanus wound prophylaxis  Tdap Vaccine - COST NOT COVERED BY MEDICARE PART B Recommended at least once for all adults  For pregnant patients, recommended with each pregnancy  Shingles Vaccine (Shingrix) - COST NOT COVERED BY MEDICARE PART B  2 shot series recommended in those aged 48 and above     Health Maintenance Due:  There are no preventive care reminders to display for this patient  Immunizations Due:      Topic Date Due    COVID-19 Vaccine (1) Never done     Advance Directives   What are advance directives? Advance directives are legal documents that state your wishes and plans for medical care  These plans are made ahead of time in case you lose your ability to make decisions for yourself  Advance directives can apply to any medical decision, such as the treatments you want, and if you want to donate organs  What are the types of advance directives? There are many types of advance directives, and each state has rules about how to use them   You may choose a combination of any of the following:  · Living will: This is a written record of the treatment you want  You can also choose which treatments you do not want, which to limit, and which to stop at a certain time  This includes surgery, medicine, IV fluid, and tube feedings  · Durable power of  for healthcare [facility]): This is a written record that states who you want to make healthcare choices for you when you are unable to make them for yourself  This person, called a proxy, is usually a family member or a friend  You may choose more than 1 proxy  · Do not resuscitate (DNR) order:  A DNR order is used in case your heart stops beating or you stop breathing  It is a request not to have certain forms of treatment, such as CPR  A DNR order may be included in other types of advance directives  · Medical directive: This covers the care that you want if you are in a coma, near death, or unable to make decisions for yourself  You can list the treatments you want for each condition  Treatment may include pain medicine, surgery, blood transfusions, dialysis, IV or tube feedings, and a ventilator (breathing machine)  · Values history: This document has questions about your views, beliefs, and how you feel and think about life  This information can help others choose the care that you would choose  Why are advance directives important? An advance directive helps you control your care  Although spoken wishes may be used, it is better to have your wishes written down  Spoken wishes can be misunderstood, or not followed  Treatments may be given even if you do not want them  An advance directive may make it easier for your family to make difficult choices about your care  Fall Prevention    Fall prevention  includes ways to make your home and other areas safer  It also includes ways you can move more carefully to prevent a fall   Health conditions that cause changes in your blood pressure, vision, or muscle strength and coordination may increase your risk for falls  Medicines may also increase your risk for falls if they make you dizzy, weak, or sleepy  Fall prevention tips:   · Stand or sit up slowly  · Use assistive devices as directed  · Wear shoes that fit well and have soles that   · Wear a personal alarm  · Stay active  · Manage your medical conditions  Home Safety Tips:  · Add items to prevent falls in the bathroom  · Keep paths clear  · Install bright lights in your home  · Keep items you use often on shelves within reach  · Paint or place reflective tape on the edges of your stairs  Urinary Incontinence   Urinary incontinence (UI)  is when you lose control of your bladder  UI develops because your bladder cannot store or empty urine properly  The 3 most common types of UI are stress incontinence, urge incontinence, or both  Medicines:   · May be given to help strengthen your bladder control  Report any side effects of medication to your healthcare provider  Do pelvic muscle exercises often:  Your pelvic muscles help you stop urinating  Squeeze these muscles tight for 5 seconds, then relax for 5 seconds  Gradually work up to squeezing for 10 seconds  Do 3 sets of 15 repetitions a day, or as directed  This will help strengthen your pelvic muscles and improve bladder control  Train your bladder:  Go to the bathroom at set times, such as every 2 hours, even if you do not feel the urge to go  You can also try to hold your urine when you feel the urge to go  For example, hold your urine for 5 minutes when you feel the urge to go  As that becomes easier, hold your urine for 10 minutes  Self-care:   · Keep a UI record  Write down how often you leak urine and how much you leak  Make a note of what you were doing when you leaked urine  · Drink liquids as directed  You may need to limit the amount of liquid you drink to help control your urine leakage  Do not drink any liquid right before you go to bed  Limit or do not have drinks that contain caffeine or alcohol  · Prevent constipation  Eat a variety of high-fiber foods  Good examples are high-fiber cereals, beans, vegetables, and whole-grain breads  Walking is the best way to trigger your intestines to have a bowel movement  · Exercise regularly and maintain a healthy weight  Weight loss and exercise will decrease pressure on your bladder and help you control your leakage  · Use a catheter as directed  to help empty your bladder  A catheter is a tiny, plastic tube that is put into your bladder to drain your urine  · Go to behavior therapy as directed  Behavior therapy may be used to help you learn to control your urge to urinate  Weight Management   Why it is important to manage your weight:  Being overweight increases your risk of health conditions such as heart disease, high blood pressure, type 2 diabetes, and certain types of cancer  It can also increase your risk for osteoarthritis, sleep apnea, and other respiratory problems  Aim for a slow, steady weight loss  Even a small amount of weight loss can lower your risk of health problems  How to lose weight safely:  A safe and healthy way to lose weight is to eat fewer calories and get regular exercise  You can lose up about 1 pound a week by decreasing the number of calories you eat by 500 calories each day  Healthy meal plan for weight management:  A healthy meal plan includes a variety of foods, contains fewer calories, and helps you stay healthy  A healthy meal plan includes the following:  · Eat whole-grain foods more often  A healthy meal plan should contain fiber  Fiber is the part of grains, fruits, and vegetables that is not broken down by your body  Whole-grain foods are healthy and provide extra fiber in your diet  Some examples of whole-grain foods are whole-wheat breads and pastas, oatmeal, brown rice, and bulgur  · Eat a variety of vegetables every day    Include dark, leafy greens such as spinach, kale, yulia greens, and mustard greens  Eat yellow and orange vegetables such as carrots, sweet potatoes, and winter squash  · Eat a variety of fruits every day  Choose fresh or canned fruit (canned in its own juice or light syrup) instead of juice  Fruit juice has very little or no fiber  · Eat low-fat dairy foods  Drink fat-free (skim) milk or 1% milk  Eat fat-free yogurt and low-fat cottage cheese  Try low-fat cheeses such as mozzarella and other reduced-fat cheeses  · Choose meat and other protein foods that are low in fat  Choose beans or other legumes such as split peas or lentils  Choose fish, skinless poultry (chicken or turkey), or lean cuts of red meat (beef or pork)  Before you cook meat or poultry, cut off any visible fat  · Use less fat and oil  Try baking foods instead of frying them  Add less fat, such as margarine, sour cream, regular salad dressing and mayonnaise to foods  Eat fewer high-fat foods  Some examples of high-fat foods include french fries, doughnuts, ice cream, and cakes  · Eat fewer sweets  Limit foods and drinks that are high in sugar  This includes candy, cookies, regular soda, and sweetened drinks  Exercise:  Exercise at least 30 minutes per day on most days of the week  Some examples of exercise include walking, biking, dancing, and swimming  You can also fit in more physical activity by taking the stairs instead of the elevator or parking farther away from stores  Ask your healthcare provider about the best exercise plan for you  © Copyright [employer] 2018 Information is for End User's use only and may not be sold, redistributed or otherwise used for commercial purposes   All illustrations and images included in CareNotes® are the copyrighted property of A D A M , [employer]  or [address] [place] [address] [employer]

## 2021-06-21 NOTE — PROGRESS NOTES
Assessment/Plan:    No problem-specific Assessment & Plan notes found for this encounter  Diagnoses and all orders for this visit:    Essential hypertension  -     CBC and differential; Future  -     TSH, 3rd generation; Future  -     Vitamin D 25 hydroxy; Future  -     Vitamin B12; Future  -     Lyme Antibody Profile with reflex to WB; Future  -     Diclofenac Sodium (VOLTAREN) 1 %; Apply 2 g topically 4 (four) times a day    Type 2 diabetes mellitus with stage 4 chronic kidney disease, without long-term current use of insulin (HCC)  -     Microalbumin / creatinine urine ratio ([facility]); Future  -     Ambulatory Referral to Ophthalmology; Future  -     POCT hemoglobin A1c  -     CBC and differential; Future  -     TSH, 3rd generation; Future  -     Vitamin D 25 hydroxy; Future  -     Vitamin B12; Future  -     Lyme Antibody Profile with reflex to WB; Future  -     Diclofenac Sodium (VOLTAREN) 1 %; Apply 2 g topically 4 (four) times a day    Monoclonal gammopathy  -     CBC and differential; Future  -     TSH, 3rd generation; Future  -     Vitamin D 25 hydroxy; Future  -     Vitamin B12; Future  -     Lyme Antibody Profile with reflex to WB; Future  -     Diclofenac Sodium (VOLTAREN) 1 %; Apply 2 g topically 4 (four) times a day    Ulcer of left foot, with fat layer exposed (HCC)  -     Diclofenac Sodium (VOLTAREN) 1 %; Apply 2 g topically 4 (four) times a day    Toe osteomyelitis (HCC)  -     Diclofenac Sodium (VOLTAREN) 1 %; Apply 2 g topically 4 (four) times a day    Anemia due to vitamin B12 deficiency, unspecified B12 deficiency type  -     Vitamin B12; Future  -     Diclofenac Sodium (VOLTAREN) 1 %; Apply 2 g topically 4 (four) times a day    Frequent falls  -     Ambulatory referral to Physical Therapy; Future  -     Diclofenac Sodium (VOLTAREN) 1 %; Apply 2 g topically 4 (four) times a day    Arthritis  -     RF Screen w/ Reflex to Titer;  Future  -     Cyclic citrul peptide antibody, IgG; Future  -     EMILY Screen w/ Reflex to Titer/Pattern; Future  -     Diclofenac Sodium (VOLTAREN) 1 %; Apply 2 g topically 4 (four) times a day          Subjective:      Patient ID: Kami Fernandes is a 80 y o  female  She's fallen three or four times in the last year  She is walking with a walker  She declines physical therapy  Her BP is well controlled in the office today  She has no CP or SOB  She has no HA or vision changes  She is not on a statin  She has refused one on previous occasions  Her A1c is at goal on no medications  The following portions of the patient's history were reviewed and updated as appropriate:   She  has no past medical history on file  She   Patient Active Problem List    Diagnosis Date Noted    Medicare annual wellness visit, subsequent 06/09/2020    Hip fracture ([facility] ) 12/24/2019    Stage 3 chronic kidney disease ([place] [id number] ) 12/20/2019    Monoclonal gammopathy 06/05/2019    Type 2 diabetes mellitus with stage 3 chronic kidney disease, without long-term current use of insulin ([place] [id number] ) 06/05/2019    Chronic gout of foot due to renal impairment 11/29/2018    Arthralgia of toe 11/07/2018    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 11/07/2018    Cardiac murmur, previously undiagnosed 01/27/2018    DM type 2 causing CKD stage 4 ([facility] ) 06/26/2017    Urinary incontinence 06/26/2017    Diabetes mellitus type II, controlled ([place] [address] ) 08/24/2015    Hyperglycemia 08/26/2014    Esophageal reflux 08/15/2013    Allergic rhinitis 02/15/2013    Hyperlipidemia 02/13/2013    Hypertension 02/13/2013    Osteoarthritis 02/13/2013    Cortical senile cataract 12/16/2008     She  has a past surgical history that includes Appendectomy; Mastectomy; Cataract extraction; Colonoscopy; Hand surgery; Total abdominal hysterectomy w/ bilateral salpingoophorectomy; and Cholecystectomy  Her family history includes Diabetes in her father and mother; Heart disease in her mother;  Other in her father  She  reports that she has never smoked  She has never used smokeless tobacco  She reports that she does not drink alcohol and does not use drugs  Current Outpatient Medications   Medication Sig Dispense Refill    acetaminophen (TYLENOL) 500 mg tablet Take 1 tablet (500 mg total) by mouth every 4 (four) hours as needed for mild pain 120 tablet 0    amLODIPine (NORVASC) 10 mg tablet       carvedilol (COREG) 6 25 mg tablet       Multiple Vitamins-Minerals (TGT MULTIVITAMIN/MULTIMINERAL) TABS Take by mouth      pantoprazole (PROTONIX) 40 mg tablet Take 40 mg by mouth      triamterene-hydrochlorothiazide (DYAZIDE) 37 5-25 mg per capsule Take 1 capsule by mouth daily 90 capsule 1    cloNIDine (CATAPRES) 0 1 mg tablet Take 1 tablet (0 1 mg total) by mouth once for 1 dose 90 tablet 3    Diclofenac Sodium (VOLTAREN) 1 % Apply 2 g topically 4 (four) times a day 350 g 5     No current facility-administered medications for this visit       Current Outpatient Medications on File Prior to Visit   Medication Sig    acetaminophen (TYLENOL) 500 mg tablet Take 1 tablet (500 mg total) by mouth every 4 (four) hours as needed for mild pain    amLODIPine (NORVASC) 10 mg tablet     carvedilol (COREG) 6 25 mg tablet     Multiple Vitamins-Minerals (TGT MULTIVITAMIN/MULTIMINERAL) TABS Take by mouth    pantoprazole (PROTONIX) 40 mg tablet Take 40 mg by mouth    triamterene-hydrochlorothiazide (DYAZIDE) 37 5-25 mg per capsule Take 1 capsule by mouth daily    cloNIDine (CATAPRES) 0 1 mg tablet Take 1 tablet (0 1 mg total) by mouth once for 1 dose    [DISCONTINUED] aspirin 81 mg chewable tablet Chew (Patient not taking: Reported on 6/21/2021)    [DISCONTINUED] diclofenac sodium (VOLTAREN) 1 % Apply 2 g topically 4 (four) times a day (Patient not taking: Reported on 6/21/2021)    [DISCONTINUED] fluticasone (FLONASE) 50 mcg/act nasal spray into each nostril (Patient not taking: Reported on 6/21/2021)    [DISCONTINUED] latanoprost (XALATAN) 0 005 % ophthalmic solution Apply to eye (Patient not taking: Reported on 6/21/2021)    [DISCONTINUED] lysine 500 MG TABS Take by mouth (Patient not taking: Reported on 6/21/2021)    [DISCONTINUED] Omega-3 Fatty Acids (FISH OIL) 1,000 mg Take 2 capsules (2,000 mg total) by mouth 2 (two) times a day (Patient not taking: Reported on 6/21/2021)    [DISCONTINUED] sulfamethoxazole-trimethoprim (BACTRIM DS) 800-160 mg per tablet  (Patient not taking: Reported on 6/21/2021)    [DISCONTINUED] sulindac (CLINORIL) 200 MG tablet Take 1 tablet (200 mg total) by mouth 2 (two) times a day (Patient not taking: Reported on 6/21/2021)     No current facility-administered medications on file prior to visit  She is allergic to atorvastatin, morphine and related, pravastatin, tetanus toxoid, and tetanus toxoids       Review of Systems   All other systems reviewed and are negative  Objective:      /68   Pulse 84   Temp 98 °F (36 7 °C)   Ht 5' 3" (1 6 m)   Wt 77 1 kg (170 lb)   SpO2 95%   BMI 30 11 kg/m²          Physical Exam  Vitals and nursing note reviewed  Constitutional:       Appearance: Normal appearance  She is obese  HENT:      Head: Normocephalic and atraumatic  Cardiovascular:      Rate and Rhythm: Normal rate and regular rhythm  Pulses: Normal pulses  Heart sounds: Murmur heard  Crescendo decrescendo systolic murmur is present with a grade of 3/6  Pulmonary:      Effort: Pulmonary effort is normal       Breath sounds: Normal breath sounds  Abdominal:      General: Abdomen is flat  Bowel sounds are normal       Palpations: Abdomen is soft  Musculoskeletal:         General: Normal range of motion  Cervical back: Normal range of motion and neck supple  Right lower leg: No edema  Left lower leg: No edema  Skin:     General: Skin is warm and dry  Capillary Refill: Capillary refill takes less than 2 seconds     Neurological: General: No focal deficit present  Mental Status: She is alert and oriented to person, place, and time  Mental status is at baseline  Psychiatric:         Mood and Affect: Mood normal          Behavior: Behavior normal          Thought Content:  Thought content normal          Judgment: Judgment normal

## 2021-06-21 NOTE — PROGRESS NOTES
BMI Counseling: Body mass index is 30 11 kg/m²  The BMI is above normal  Nutrition recommendations include reducing portion sizes, decreasing overall calorie intake, 3-5 servings of fruits/vegetables daily, reducing fast food intake, consuming healthier snacks, decreasing soda and/or juice intake, moderation in carbohydrate intake, increasing intake of lean protein, reducing intake of saturated fat and trans fat and reducing intake of cholesterol  Exercise recommendations include moderate aerobic physical activity for 150 minutes/week, vigorous aerobic physical activity for 75 minutes/week, exercising 3-5 times per week, joining a gym and strength training exercises

## 2021-06-21 NOTE — PROGRESS NOTES
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)    Relevant Orders    Microalbumin / creatinine urine ratio ([facility])    Ambulatory Referral to Ophthalmology    POCT hemoglobin A1c           Preventive health issues were discussed with patient, and age appropriate screening tests were ordered as noted in patient's After Visit Summary  Personalized health advice and appropriate referrals for health education or preventive services given if needed, as noted in patient's After Visit Summary  History of Present Illness:     Patient presents for Welcome to Medicare visit  Patient Care Team:  Staci Serrano MD as PCP - General  Familia Ayon DO (Cardiology)  Jessica Velasquez DO (General Surgery)     Review of Systems:     Review of Systems   Problem List:     Patient Active Problem List   Diagnosis    Ulcer of foot ([address] )    Arthralgia of toe    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    Cardiac murmur, previously undiagnosed    Allergic rhinitis    DM type 2 causing CKD stage 4 ([address] )    Diabetes mellitus type II, controlled ([address] )    Esophageal reflux    Hyperlipidemia    Hypertension    Osteoarthritis    Cortical senile cataract    Hyperglycemia    Urinary incontinence    Chronic gout of foot due to renal impairment    Monoclonal gammopathy    Type 2 diabetes mellitus with stage 3 chronic kidney disease, without long-term current use of insulin ([address] )    Hip fracture (HCC)    Stage 3 chronic kidney disease ([address] )    Toe osteomyelitis ([address] )    Medicare annual wellness visit, subsequent      Past Medical and Surgical History:     No past medical history on file    Past Surgical History:   Procedure Laterality Date    APPENDECTOMY      CATARACT EXTRACTION      CHOLECYSTECTOMY      COLONOSCOPY      complete    HAND SURGERY      MASTECTOMY      Breast    TOTAL ABDOMINAL HYSTERECTOMY W/ BILATERAL SALPINGOOPHORECTOMY        Family History:     Family History   Problem Relation Age of Onset    Diabetes Mother     Heart disease Mother         valvular    Other Father         coal workers' pneumoconiosis (completed)    Diabetes Father       Social History:     Social History     Socioeconomic History    Marital status:      Spouse name: Not on file    Number of children: Not on file    Years of education: Not on file    Highest education level: Not on file   Occupational History    Occupation: Retired   Tobacco Use    Smoking status: Never Smoker    Smokeless tobacco: Never Used   Substance and Sexual Activity    Alcohol use: No     Comment: Rarely consumes alcohol, per [employer]    Drug use: No    Sexual activity: Not on file   Other Topics Concern    Not on file   Social History Narrative    Daily caffeinated coffee consumption, 2-3 cups per day    Exercise: walking, 1-2 hours per week    No advance directives    Well balanced diet     Social Determinants of Health     Financial Resource Strain:     Difficulty of Paying Living Expenses:    Food Insecurity:     Worried About Running Out of Food in the Last Year:     Ran Out of Food in the Last Year:    Transportation Needs:     Lack of Transportation (Medical):      Lack of Transportation (Non-Medical):    Physical Activity:     Days of Exercise per Week:     Minutes of Exercise per Session:    Stress:     Feeling of Stress :    Social Connections:     Frequency of Communication with Friends and Family:     Frequency of Social Gatherings with Friends and Family:     Attends [employer] Services:     Active Member of Clubs or Organizations:     Attends Club or Organization Meetings:     Marital Status:    Intimate Partner Violence:     Fear of Current or Ex-Partner:     Emotionally Abused:     Physically Abused:     Sexually Abused:       Medications and Allergies:     Current Outpatient Medications   Medication Sig Dispense Refill    acetaminophen (TYLENOL) 500 mg tablet Take 1 tablet (500 mg total) by mouth every 4 (four) hours as needed for mild pain 120 tablet 0    amLODIPine (NORVASC) 10 mg tablet       aspirin 81 mg chewable tablet Chew      carvedilol (COREG) 6 25 mg tablet       cloNIDine (CATAPRES) 0 1 mg tablet Take 1 tablet (0 1 mg total) by mouth once for 1 dose 90 tablet 3    diclofenac sodium (VOLTAREN) 1 % Apply 2 g topically 4 (four) times a day 5 Tube 5    fluticasone (FLONASE) 50 mcg/act nasal spray into each nostril      latanoprost (XALATAN) 0 005 % ophthalmic solution Apply to eye      lysine 500 MG TABS Take by mouth      Multiple Vitamins-Minerals (TGT MULTIVITAMIN/MULTIMINERAL) TABS Take by mouth      Omega-3 Fatty Acids (FISH OIL) 1,000 mg Take 2 capsules (2,000 mg total) by mouth 2 (two) times a day 120 capsule 2    pantoprazole (PROTONIX) 40 mg tablet Take 40 mg by mouth      sulfamethoxazole-trimethoprim (BACTRIM DS) 800-160 mg per tablet       sulindac (CLINORIL) 200 MG tablet Take 1 tablet (200 mg total) by mouth 2 (two) times a day 180 tablet 3    triamterene-hydrochlorothiazide (DYAZIDE) 37 5-25 mg per capsule Take 1 capsule by mouth daily 90 capsule 1     No current facility-administered medications for this visit  Allergies   Allergen Reactions    Atorvastatin      Muscle aches    Morphine And Related     Pravastatin      Rash    Tetanus Toxoid Swelling     Arm swelled 30 yrs ago  Pt states she" had one 10 yrs ago and nothing happened"      Tetanus Toxoids       Immunizations:     Immunization History   Administered Date(s) Administered    INFLUENZA 10/09/2012, 01/01/2013, 10/28/2014, 10/05/2015, 10/11/2016, 09/19/2017, 09/26/2018, 09/16/2019, 10/16/2020    Influenza Quadrivalent 3 years and older 09/16/2019    Influenza Quadrivalent Preservative Free 3 years and older IM 09/19/2017    Influenza Split High Dose Preservative Free IM 10/05/2015, 09/26/2018, 09/16/2019    Influenza, seasonal, injectable 10/28/2014    Pneumococcal Conjugate 13-Valent 06/26/2017    Pneumococcal Polysaccharide PPV23 01/01/2010    Tdap 08/24/2015, 08/24/2015    Zoster 01/01/2013, 02/27/2013      Health Maintenance: There are no preventive care reminders to display for this patient  Topic Date Due    COVID-19 Vaccine (1) Never done      Medicare Screening Tests and Risk Assessments:     Meir is here for her Subsequent Wellness visit  Last Medicare Wellness visit information reviewed, patient interviewed and updates made to the record today  Health Risk Assessment:   Patient rates overall health as good  Patient feels that their physical health rating is slightly worse  Patient is satisfied with their life  Eyesight was rated as same  Hearing was rated as slightly worse  Patient feels that their emotional and mental health rating is same  Patients states they are never, rarely angry  Patient states they are often unusually tired/fatigued  Pain experienced in the last 7 days has been some  Patient's pain rating has been 6/10  Patient states that she has experienced no weight loss or gain in last 6 months  Depression Screening:   PHQ-2 Score: 0      Fall Risk Screening: In the past year, patient has experienced: history of falling in past year    Number of falls: 2 or more  Injured during fall?: Yes    Feels unsteady when standing or walking?: Yes    Worried about falling?: Yes      Urinary Incontinence Screening:   Patient has leaked urine accidently in the last six months  Home Safety:  Patient has trouble with stairs inside or outside of their home  Patient has working smoke alarms and has working carbon monoxide detector  Home safety hazards include: none  Nutrition:   Current diet is Regular, No Added Salt and Limited junk food  Medications:   Patient is currently taking over-the-counter supplements   OTC medications include: see medication list  Patient is able to manage medications  Activities of Daily Living (ADLs)/Instrumental Activities of Daily Living (IADLs):   Walk and transfer into and out of bed and chair?: Yes  Dress and groom yourself?: Yes    Bathe or shower yourself?: Yes    Feed yourself? Yes  Do your laundry/housekeeping?: No  Manage your money, pay your bills and track your expenses?: Yes  Make your own meals?: Yes    Do your own shopping?: No    Advance Care Planning:   Living will: Yes    Durable POA for healthcare: Yes    Advanced directive: Yes      Cognitive Screening:   Provider or family/friend/caregiver concerned regarding cognition?: No    PREVENTIVE SCREENINGS      Cardiovascular Screening:    General: Screening Not Indicated and History Lipid Disorder      Diabetes Screening:     General: Screening Not Indicated and History Diabetes      Colorectal Cancer Screening:     General: Screening Not Indicated      Breast Cancer Screening:     General: Screening Not Indicated      Cervical Cancer Screening:    General: Screening Not Indicated      Osteoporosis Screening:    General: Screening Not Indicated      Abdominal Aortic Aneurysm (AAA) Screening:        General: Screening Not Indicated      Lung Cancer Screening:     General: Screening Not Indicated      Hepatitis C Screening:    General: Screening Not Indicated    Screening, Brief Intervention, and Referral to Treatment (SBIRT)    Screening  Typical number of drinks in a day: 0    Single Item Drug Screening:  How often have you used an illegal drug (including marijuana) or a prescription medication for non-medical reasons in the past year? never    Single Item Drug Screen Score: 0  Interpretation: Negative screen for possible drug use disorder    No exam data present     Physical Exam:     There were no vitals taken for this visit      Physical Exam     Dawood Alvares MD

## 2021-09-23 ENCOUNTER — TELEPHONE (OUTPATIENT)
Dept: FAMILY MEDICINE CLINIC | Facility: CLINIC | Age: 86
End: 2021-09-23

## 2021-09-23 DIAGNOSIS — R60.9 PERIPHERAL EDEMA: Primary | ICD-10-CM

## 2021-09-23 RX ORDER — FUROSEMIDE 20 MG/1
20 TABLET ORAL DAILY
Qty: 30 TABLET | Refills: 5 | Status: SHIPPED | OUTPATIENT
Start: 2021-09-23 | End: 2021-12-27

## 2021-09-23 NOTE — TELEPHONE ENCOUNTER
Pt called requested rx for leg edema, states she has increased swelling during the day   [facility]

## 2021-12-17 ENCOUNTER — RA CDI HCC (OUTPATIENT)
Dept: OTHER | Facility: HOSPITAL | Age: 86
End: 2021-12-17

## 2021-12-17 NOTE — PROGRESS NOTES
[facility]  coding opportunities             Chart Reviewed * (Number of) Inbasket suggestions sent to Provider: 1            Number of suggestions used: 0      Number of suggestions NOT actually used: 1     Patients insurance company: Medicare     Visit status: Patient arrived for their scheduled appointment     Provider never responded to [facility] [email or web address]  coding request     [facility] [email or web address]  coding opportunities             Chart Reviewed * (Number of) Inbasket suggestions sent to Provider: 1      I12 9 Hypertensive chronic kidney disease with stage 1 through stage 4 chronic kidney disease, or unspecified chronic kidney disease  * HTN with CKD      If this is correct, please document and assess at your next visit,12/27/2021            Patients insurance company: [employer]

## 2021-12-27 ENCOUNTER — OFFICE VISIT (OUTPATIENT)
Dept: FAMILY MEDICINE CLINIC | Facility: CLINIC | Age: 86
End: 2021-12-27
Payer: MEDICARE

## 2021-12-27 VITALS
HEART RATE: 76 BPM | HEIGHT: 63 IN | SYSTOLIC BLOOD PRESSURE: 118 MMHG | RESPIRATION RATE: 18 BRPM | OXYGEN SATURATION: 99 % | WEIGHT: 175.2 LBS | DIASTOLIC BLOOD PRESSURE: 80 MMHG | TEMPERATURE: 98.2 F | BODY MASS INDEX: 31.04 KG/M2

## 2021-12-27 DIAGNOSIS — D47.2 MONOCLONAL GAMMOPATHY: ICD-10-CM

## 2021-12-27 DIAGNOSIS — N18.4 TYPE 2 DIABETES MELLITUS WITH STAGE 4 CHRONIC KIDNEY DISEASE, WITHOUT LONG-TERM CURRENT USE OF INSULIN (HCC): ICD-10-CM

## 2021-12-27 DIAGNOSIS — R60.9 PERIPHERAL EDEMA: Primary | ICD-10-CM

## 2021-12-27 DIAGNOSIS — I10 ESSENTIAL HYPERTENSION: ICD-10-CM

## 2021-12-27 DIAGNOSIS — E78.2 MIXED HYPERLIPIDEMIA: ICD-10-CM

## 2021-12-27 DIAGNOSIS — E11.22 TYPE 2 DIABETES MELLITUS WITH STAGE 4 CHRONIC KIDNEY DISEASE, WITHOUT LONG-TERM CURRENT USE OF INSULIN (HCC): ICD-10-CM

## 2021-12-27 PROBLEM — N18.30 TYPE 2 DIABETES MELLITUS WITH STAGE 3 CHRONIC KIDNEY DISEASE, WITHOUT LONG-TERM CURRENT USE OF INSULIN (HCC): Status: RESOLVED | Noted: 2019-06-05 | Resolved: 2021-12-27

## 2021-12-27 PROCEDURE — 99213 OFFICE O/P EST LOW 20 MIN: CPT | Performed by: FAMILY MEDICINE

## 2021-12-27 RX ORDER — FUROSEMIDE 40 MG/1
40 TABLET ORAL 2 TIMES DAILY
Qty: 90 TABLET | Refills: 3 | Status: SHIPPED | OUTPATIENT
Start: 2021-12-27 | End: 2022-05-16

## 2021-12-27 RX ORDER — AMLODIPINE BESYLATE 10 MG/1
10 TABLET ORAL DAILY
Qty: 90 TABLET | Refills: 3 | Status: SHIPPED | OUTPATIENT
Start: 2021-12-27

## 2021-12-27 RX ORDER — CARVEDILOL 6.25 MG/1
6.25 TABLET ORAL 2 TIMES DAILY WITH MEALS
Qty: 180 TABLET | Refills: 3 | Status: SHIPPED | OUTPATIENT
Start: 2021-12-27

## 2021-12-27 RX ORDER — POTASSIUM CHLORIDE 1.5 G/1.77G
20 POWDER, FOR SOLUTION ORAL DAILY
Status: CANCELLED | OUTPATIENT
Start: 2021-12-27

## 2021-12-27 RX ORDER — POTASSIUM CHLORIDE 20 MEQ/1
20 TABLET, EXTENDED RELEASE ORAL 2 TIMES DAILY
Qty: 180 TABLET | Refills: 3 | Status: SHIPPED | OUTPATIENT
Start: 2021-12-27

## 2022-05-16 DIAGNOSIS — R60.9 PERIPHERAL EDEMA: ICD-10-CM

## 2022-05-16 RX ORDER — FUROSEMIDE 40 MG/1
40 TABLET ORAL 2 TIMES DAILY
Qty: 90 TABLET | Refills: 3 | Status: SHIPPED | OUTPATIENT
Start: 2022-05-16 | End: 2022-07-25 | Stop reason: SDUPTHER

## 2022-06-06 LAB — HBA1C MFR BLD HPLC: 6.8 %

## 2022-06-23 ENCOUNTER — RA CDI HCC (OUTPATIENT)
Dept: OTHER | Facility: HOSPITAL | Age: 87
End: 2022-06-23

## 2022-06-23 NOTE — PROGRESS NOTES
[facility]  coding opportunities          Chart Reviewed number of suggestions sent to Provider: 1   E11 22 N18 4     Patients Insurance     Medicare Insurance: [employer]

## 2022-06-30 ENCOUNTER — OFFICE VISIT (OUTPATIENT)
Dept: FAMILY MEDICINE CLINIC | Facility: CLINIC | Age: 87
End: 2022-06-30
Payer: MEDICARE

## 2022-06-30 VITALS
WEIGHT: 167.4 LBS | DIASTOLIC BLOOD PRESSURE: 74 MMHG | TEMPERATURE: 98 F | BODY MASS INDEX: 29.66 KG/M2 | SYSTOLIC BLOOD PRESSURE: 124 MMHG | HEIGHT: 63 IN | OXYGEN SATURATION: 94 % | RESPIRATION RATE: 18 BRPM | HEART RATE: 80 BPM

## 2022-06-30 DIAGNOSIS — N18.32 STAGE 3B CHRONIC KIDNEY DISEASE (HCC): ICD-10-CM

## 2022-06-30 DIAGNOSIS — I50.9 EDEMA DUE TO CONGESTIVE HEART FAILURE (HCC): Primary | ICD-10-CM

## 2022-06-30 PROBLEM — S72.009A HIP FRACTURE (HCC): Status: RESOLVED | Noted: 2019-12-24 | Resolved: 2022-06-30

## 2022-06-30 PROCEDURE — G0439 PPPS, SUBSEQ VISIT: HCPCS | Performed by: FAMILY MEDICINE

## 2022-06-30 PROCEDURE — 99214 OFFICE O/P EST MOD 30 MIN: CPT | Performed by: FAMILY MEDICINE

## 2022-06-30 RX ORDER — CIPROFLOXACIN 500 MG/1
TABLET, FILM COATED ORAL
COMMUNITY
Start: 2022-06-16

## 2022-06-30 NOTE — PROGRESS NOTES
Assessment/Plan:    No problem-specific Assessment & Plan notes found for this encounter  Diagnoses and all orders for this visit:    Edema due to congestive heart failure (HCC)  -     Compression Stocking    Stage 3b chronic kidney disease ([facility] )    Other orders  -     ciprofloxacin (CIPRO) 500 mg tablet          Subjective:      Patient ID: Kaim Fernandes is a 80 y o  female  Her feet are swelling  She is taking lasix without much relief  She is mildly SOB  She has at least moderate AS  She has borderline renal function  I discussed the potential for TVAR  She is not interested in much as far as diagnostic tests or treatment  The following portions of the patient's history were reviewed and updated as appropriate:   She  has a past medical history of Hip fracture ([facility] ) (12/24/2019)  She   Patient Active Problem List    Diagnosis Date Noted    Medicare annual wellness visit, subsequent 06/09/2020    Stage 3 chronic kidney disease ([facility] ) 12/20/2019    Monoclonal gammopathy 06/05/2019    Chronic gout of foot due to renal impairment 11/29/2018    Arthralgia of toe 11/07/2018    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 11/07/2018    Cardiac murmur, previously undiagnosed 01/27/2018    Urinary incontinence 06/26/2017    Hyperglycemia 08/26/2014    Esophageal reflux 08/15/2013    Allergic rhinitis 02/15/2013    Hyperlipidemia 02/13/2013    Hypertension 02/13/2013    Osteoarthritis 02/13/2013    Cortical senile cataract 12/16/2008     She  has a past surgical history that includes Appendectomy; Mastectomy; Cataract extraction; Colonoscopy; Hand surgery; Total abdominal hysterectomy w/ bilateral salpingoophorectomy; and Cholecystectomy  Her family history includes Diabetes in her father and mother; Heart disease in her mother; Other in her father  She  reports that she has never smoked   She has never used smokeless tobacco  She reports that she does not drink alcohol and does not use drugs   Current Outpatient Medications   Medication Sig Dispense Refill    amLODIPine (NORVASC) 10 mg tablet Take 1 tablet (10 mg total) by mouth daily 90 tablet 3    carvedilol (COREG) 6 25 mg tablet Take 1 tablet (6 25 mg total) by mouth 2 (two) times a day with meals 180 tablet 3    ciprofloxacin (CIPRO) 500 mg tablet       furosemide (LASIX) 40 mg tablet TAKE 1 TABLET (40 MG TOTAL) BY MOUTH 2 (TWO) TIMES A DAY 90 tablet 3    Multiple Vitamins-Minerals (TGT MULTIVITAMIN/MULTIMINERAL) TABS Take 1 tablet by mouth in the morning        potassium chloride (K-DUR,KLOR-CON) 20 mEq tablet Take 1 tablet (20 mEq total) by mouth 2 (two) times a day 180 tablet 3     No current facility-administered medications for this visit  Current Outpatient Medications on File Prior to Visit   Medication Sig    amLODIPine (NORVASC) 10 mg tablet Take 1 tablet (10 mg total) by mouth daily    carvedilol (COREG) 6 25 mg tablet Take 1 tablet (6 25 mg total) by mouth 2 (two) times a day with meals    ciprofloxacin (CIPRO) 500 mg tablet     furosemide (LASIX) 40 mg tablet TAKE 1 TABLET (40 MG TOTAL) BY MOUTH 2 (TWO) TIMES A DAY    Multiple Vitamins-Minerals (TGT MULTIVITAMIN/MULTIMINERAL) TABS Take 1 tablet by mouth in the morning      potassium chloride (K-DUR,KLOR-CON) 20 mEq tablet Take 1 tablet (20 mEq total) by mouth 2 (two) times a day     No current facility-administered medications on file prior to visit  She is allergic to atorvastatin, morphine and related, tetanus toxoid, and pravastatin       Review of Systems   All other systems reviewed and are negative  Objective:      /74 (BP Location: Left arm, Patient Position: Sitting, Cuff Size: Standard)   Pulse 80   Temp 98 °F (36 7 °C) (Temporal)   Resp 18   Ht 5' 3" (1 6 m)   Wt 75 9 kg (167 lb 6 4 oz)   SpO2 94%   BMI 29 65 kg/m²          Physical Exam  Vitals and nursing note reviewed  Constitutional:       Appearance: Normal appearance  HENT:      Head: Normocephalic and atraumatic  Cardiovascular:      Rate and Rhythm: Normal rate and regular rhythm  Pulses: Normal pulses  Heart sounds: Normal heart sounds  Pulmonary:      Effort: Pulmonary effort is normal       Breath sounds: Normal breath sounds  Abdominal:      General: Abdomen is flat  Bowel sounds are normal       Palpations: Abdomen is soft  Musculoskeletal:         General: Normal range of motion  Cervical back: Normal range of motion and neck supple  Skin:     General: Skin is warm and dry  Capillary Refill: Capillary refill takes less than 2 seconds  Neurological:      General: No focal deficit present  Mental Status: She is alert and oriented to person, place, and time  Mental status is at baseline  Psychiatric:         Mood and Affect: Mood normal          Behavior: Behavior normal          Thought Content:  Thought content normal          Judgment: Judgment normal

## 2022-06-30 NOTE — PROGRESS NOTES
Assessment and Plan:     Problem List Items Addressed This Visit    None          Preventive health issues were discussed with patient, and age appropriate screening tests were ordered as noted in patient's After Visit Summary  Personalized health advice and appropriate referrals for health education or preventive services given if needed, as noted in patient's After Visit Summary  History of Present Illness:     Patient presents for a Medicare Wellness Visit    HPI   Patient Care Team:  Azalia Evangelista MD as PCP - General  Lilly Henry DO (Cardiology)  Laya Douglas DO (General Surgery)     Review of Systems:     Review of Systems     Problem List:     Patient Active Problem List   Diagnosis    Arthralgia of toe    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    Cardiac murmur, previously undiagnosed    Allergic rhinitis    Esophageal reflux    Hyperlipidemia    Hypertension    Osteoarthritis    Cortical senile cataract    Hyperglycemia    Urinary incontinence    Chronic gout of foot due to renal impairment    Monoclonal gammopathy    [facility]    Stage 3 chronic kidney disease ([facility] )    Medicare annual wellness visit, subsequent      Past Medical and Surgical History:     No past medical history on file  Past Surgical History:   Procedure Laterality Date    APPENDECTOMY      CATARACT EXTRACTION      CHOLECYSTECTOMY      COLONOSCOPY      complete    HAND SURGERY      MASTECTOMY      Breast    TOTAL ABDOMINAL HYSTERECTOMY W/ BILATERAL SALPINGOOPHORECTOMY        Family History:     Family History   Problem Relation Age of Onset    Diabetes Mother     Heart disease Mother         valvular    Other Father         coal workers' pneumoconiosis (completed)    Diabetes Father       Social History:     Social History     Socioeconomic History    Marital status:       Spouse name: Not on file    Number of children: Not on file    Years of education: Not on file    Highest education level: Not on file   Occupational History    Occupation: Retired   Tobacco Use    Smoking status: Never Smoker    Smokeless tobacco: Never Used   Substance and Sexual Activity    Alcohol use: No     Comment: Rarely consumes alcohol, per [employer]    Drug use: No    Sexual activity: Not on file   Other Topics Concern    Not on file   Social History Narrative    Daily caffeinated coffee consumption, 2-3 cups per day    Exercise: walking, 1-2 hours per week    No advance directives    Well balanced diet     Social Determinants of Health     Financial Resource Strain: Not on file   Food Insecurity: Not on file   Transportation Needs: Not on file   Physical Activity: Not on file   Stress: Not on file   Social Connections: Not on file   Intimate Partner Violence: Not on file   Housing Stability: Not on file      Medications and Allergies:     Current Outpatient Medications   Medication Sig Dispense Refill    amLODIPine (NORVASC) 10 mg tablet Take 1 tablet (10 mg total) by mouth daily 90 tablet 3    carvedilol (COREG) 6 25 mg tablet Take 1 tablet (6 25 mg total) by mouth 2 (two) times a day with meals 180 tablet 3    furosemide (LASIX) 40 mg tablet TAKE 1 TABLET (40 MG TOTAL) BY MOUTH 2 (TWO) TIMES A DAY 90 tablet 3    Multiple Vitamins-Minerals (TGT MULTIVITAMIN/MULTIMINERAL) TABS Take 1 tablet by mouth in the morning        potassium chloride (K-DUR,KLOR-CON) 20 mEq tablet Take 1 tablet (20 mEq total) by mouth 2 (two) times a day 180 tablet 3    triamterene-hydrochlorothiazide (DYAZIDE) 37 5-25 mg per capsule Take 1 capsule by mouth daily (Patient not taking: Reported on 12/27/2021 ) 90 capsule 1     No current facility-administered medications for this visit  Allergies   Allergen Reactions    Atorvastatin Myalgia     Muscle aches    Morphine And Related Other (See Comments)     unknown    Tetanus Toxoid Swelling     Arm swelled 30 yrs ago   Pt states she" had one 10 yrs ago and nothing happened"   Pravastatin Rash      Immunizations:     Immunization History   Administered Date(s) Administered    INFLUENZA 10/09/2012, 01/01/2013, 10/28/2014, 10/05/2015, 10/11/2016, 09/19/2017, 09/26/2018, 09/16/2019, 10/16/2020    Influenza Quadrivalent 3 years and older 09/16/2019    Influenza Quadrivalent Preservative Free 3 years and older IM 09/19/2017    Influenza Split High Dose Preservative Free IM 10/05/2015, 09/26/2018, 09/16/2019    Influenza, seasonal, injectable 10/28/2014    Pneumococcal Conjugate 13-Valent 06/26/2017    Pneumococcal Polysaccharide PPV23 01/01/2010    Tdap 08/24/2015, 08/24/2015    Zoster 01/01/2013, 02/27/2013    Zoster Vaccine Recombinant 05/16/2022      Health Maintenance: There are no preventive care reminders to display for this patient  Topic Date Due    COVID-19 Vaccine (1) Never done    Influenza Vaccine (Season Ended) 09/01/2022      Medicare Screening Tests and Risk Assessments:     Last Medicare Wellness visit information reviewed, patient interviewed and updates made to the record today  Health Risk Assessment:   Patient rates overall health as good  Patient feels that their physical health rating is slightly worse  Patient is satisfied with their life  Eyesight was rated as same  Hearing was rated as slightly worse  Patient feels that their emotional and mental health rating is same  Patients states they are never, rarely angry  Patient states they are never, rarely unusually tired/fatigued  Pain experienced in the last 7 days has been some  Patient's pain rating has been 1/10  Patient states that she has experienced no weight loss or gain in last 6 months  Depression Screening:   PHQ-2 Score: 1      Fall Risk Screening: In the past year, patient has experienced: no history of falling in past year      Urinary Incontinence Screening:   Patient has leaked urine accidently in the last six months       Home Safety:  Patient has trouble with stairs inside or outside of their home  Patient has working smoke alarms and has working carbon monoxide detector  Home safety hazards include: none  Nutrition:   Current diet is Regular  Medications:   Patient is currently taking over-the-counter supplements  OTC medications include: see medication list  Patient is able to manage medications  Activities of Daily Living (ADLs)/Instrumental Activities of Daily Living (IADLs):   Walk and transfer into and out of bed and chair?: Yes  Dress and groom yourself?: Yes    Bathe or shower yourself?: Yes    Feed yourself? Yes  Do your laundry/housekeeping?: Yes  Manage your money, pay your bills and track your expenses?: Yes  Make your own meals?: Yes    Do your own shopping?: No    Previous Hospitalizations:   Any hospitalizations or ED visits within the last 12 months?: No      Advance Care Planning:   Living will: Yes    Durable POA for healthcare:  Yes    Advanced directive: Yes      Cognitive Screening:   Provider or family/friend/caregiver concerned regarding cognition?: No    PREVENTIVE SCREENINGS      Cardiovascular Screening:    General: Screening Not Indicated and History Lipid Disorder      Diabetes Screening:     General: Screening Not Indicated and History Diabetes      Colorectal Cancer Screening:     General: Screening Not Indicated      Breast Cancer Screening:     General: Screening Not Indicated      Cervical Cancer Screening:    General: Screening Not Indicated      Osteoporosis Screening:    General: Screening Not Indicated      Abdominal Aortic Aneurysm (AAA) Screening:        General: Screening Not Indicated      Lung Cancer Screening:     General: Screening Not Indicated      Hepatitis C Screening:    General: Screening Not Indicated    Screening, Brief Intervention, and Referral to Treatment (SBIRT)    Screening  Typical number of drinks in a day: 0    Single Item Drug Screening:  How often have you used an illegal drug (including marijuana) or a prescription medication for non-medical reasons in the past year? never    Single Item Drug Screen Score: 0  Interpretation: Negative screen for possible drug use disorder    No exam data present     Physical Exam:     There were no vitals taken for this visit      Physical Exam     Danny Callahan MD

## 2022-06-30 NOTE — PATIENT INSTRUCTIONS
Medicare Preventive Visit Patient Instructions  Thank you for completing your Welcome to Medicare Visit or Medicare Annual Wellness Visit today  Your next wellness visit will be due in one year (7/1/2023)  The screening/preventive services that you may require over the next 5-10 years are detailed below  Some tests may not apply to you based off risk factors and/or age  Screening tests ordered at today's visit but not completed yet may show as past due  Also, please note that scanned in results may not display below  Preventive Screenings:  Service Recommendations Previous Testing/Comments   Colorectal Cancer Screening  * Colonoscopy    * Fecal Occult Blood Test (FOBT)/Fecal Immunochemical Test (FIT)  * Fecal DNA/Cologuard Test  * Flexible Sigmoidoscopy Age: 54-65 years old   Colonoscopy: every 10 years (may be performed more frequently if at higher risk)  OR  FOBT/FIT: every 1 year  OR  Cologuard: every 3 years  OR  Sigmoidoscopy: every 5 years  Screening may be recommended earlier than age 48 if at higher risk for colorectal cancer  Also, an individualized decision between you and your healthcare provider will decide whether screening between the ages of 74-80 would be appropriate  Colonoscopy: 08/18/2011  FOBT/FIT: Not on file  Cologuard: Not on file  Sigmoidoscopy: Not on file    Screening Not Indicated     Breast Cancer Screening Age: 36 years old  Frequency: every 1-2 years  Not required if history of left and right mastectomy Mammogram: Not on file        Cervical Cancer Screening Between the ages of 21-29, pap smear recommended once every 3 years  Between the ages of 33-67, can perform pap smear with HPV co-testing every 5 years     Recommendations may differ for women with a history of total hysterectomy, cervical cancer, or abnormal pap smears in past  Pap Smear: Not on file    Screening Not Indicated   Hepatitis C Screening Once for adults born between 1945 and 1965  More frequently in patients at high risk for Hepatitis C Hep C Antibody: Not on file        Diabetes Screening 1-2 times per year if you're at risk for diabetes or have pre-diabetes Fasting glucose: 124 mg/dL   A1C: 6 8 %    Screening Not Indicated  History Diabetes   Cholesterol Screening Once every 5 years if you don't have a lipid disorder  May order more often based on risk factors  Lipid panel: 06/06/2022    Screening Not Indicated  History Lipid Disorder     Other Preventive Screenings Covered by Medicare:  1  Abdominal Aortic Aneurysm (AAA) Screening: covered once if your at risk  You're considered to be at risk if you have a family history of AAA  2  Lung Cancer Screening: covers low dose CT scan once per year if you meet all of the following conditions: (1) Age 50-69; (2) No signs or symptoms of lung cancer; (3) Current smoker or have quit smoking within the last 15 years; (4) You have a tobacco smoking history of at least 30 pack years (packs per day multiplied by number of years you smoked); (5) You get a written order from a healthcare provider  3  Glaucoma Screening: covered annually if you're considered high risk: (1) You have diabetes OR (2) Family history of glaucoma OR (3)  aged 48 and older OR (3)  American aged 72 and older  3  Osteoporosis Screening: covered every 2 years if you meet one of the following conditions: (1) You're estrogen deficient and at risk for osteoporosis based off medical history and other findings; (2) Have a vertebral abnormality; (3) On glucocorticoid therapy for more than 3 months; (4) Have primary hyperparathyroidism; (5) On osteoporosis medications and need to assess response to drug therapy  · Last bone density test (DXA Scan): Not on file  5  HIV Screening: covered annually if you're between the age of 12-76  Also covered annually if you are younger than 13 and older than 72 with risk factors for HIV infection   For pregnant patients, it is covered up to 3 times per pregnancy  Immunizations:  Immunization Recommendations   Influenza Vaccine Annual influenza vaccination during flu season is recommended for all persons aged >= 6 months who do not have contraindications   Pneumococcal Vaccine (Prevnar and Pneumovax)  * Prevnar = PCV13  * Pneumovax = PPSV23   Adults 25-60 years old: 1-3 doses may be recommended based on certain risk factors  Adults 72 years old: Prevnar (PCV13) vaccine recommended followed by Pneumovax (PPSV23) vaccine  If already received PPSV23 since turning 65, then PCV13 recommended at least one year after PPSV23 dose  Hepatitis B Vaccine 3 dose series if at intermediate or high risk (ex: diabetes, end stage renal disease, liver disease)   Tetanus (Td) Vaccine - COST NOT COVERED BY MEDICARE PART B Following completion of primary series, a booster dose should be given every 10 years to maintain immunity against tetanus  Td may also be given as tetanus wound prophylaxis  Tdap Vaccine - COST NOT COVERED BY MEDICARE PART B Recommended at least once for all adults  For pregnant patients, recommended with each pregnancy  Shingles Vaccine (Shingrix) - COST NOT COVERED BY MEDICARE PART B  2 shot series recommended in those aged 48 and above     Health Maintenance Due:  There are no preventive care reminders to display for this patient  Immunizations Due:      Topic Date Due    COVID-19 Vaccine (1) Never done    Influenza Vaccine (Season Ended) 09/01/2022     Advance Directives   What are advance directives? Advance directives are legal documents that state your wishes and plans for medical care  These plans are made ahead of time in case you lose your ability to make decisions for yourself  Advance directives can apply to any medical decision, such as the treatments you want, and if you want to donate organs  What are the types of advance directives? There are many types of advance directives, and each state has rules about how to use them   You may choose a combination of any of the following:  · Living will: This is a written record of the treatment you want  You can also choose which treatments you do not want, which to limit, and which to stop at a certain time  This includes surgery, medicine, IV fluid, and tube feedings  · Durable power of  for healthcare [facility]): This is a written record that states who you want to make healthcare choices for you when you are unable to make them for yourself  This person, called a proxy, is usually a family member or a friend  You may choose more than 1 proxy  · Do not resuscitate (DNR) order:  A DNR order is used in case your heart stops beating or you stop breathing  It is a request not to have certain forms of treatment, such as CPR  A DNR order may be included in other types of advance directives  · Medical directive: This covers the care that you want if you are in a coma, near death, or unable to make decisions for yourself  You can list the treatments you want for each condition  Treatment may include pain medicine, surgery, blood transfusions, dialysis, IV or tube feedings, and a ventilator (breathing machine)  · Values history: This document has questions about your views, beliefs, and how you feel and think about life  This information can help others choose the care that you would choose  Why are advance directives important? An advance directive helps you control your care  Although spoken wishes may be used, it is better to have your wishes written down  Spoken wishes can be misunderstood, or not followed  Treatments may be given even if you do not want them  An advance directive may make it easier for your family to make difficult choices about your care  Urinary Incontinence   Urinary incontinence (UI)  is when you lose control of your bladder  UI develops because your bladder cannot store or empty urine properly   The 3 most common types of UI are stress incontinence, urge incontinence, or both  Medicines:   · May be given to help strengthen your bladder control  Report any side effects of medication to your healthcare provider  Do pelvic muscle exercises often:  Your pelvic muscles help you stop urinating  Squeeze these muscles tight for 5 seconds, then relax for 5 seconds  Gradually work up to squeezing for 10 seconds  Do 3 sets of 15 repetitions a day, or as directed  This will help strengthen your pelvic muscles and improve bladder control  Train your bladder:  Go to the bathroom at set times, such as every 2 hours, even if you do not feel the urge to go  You can also try to hold your urine when you feel the urge to go  For example, hold your urine for 5 minutes when you feel the urge to go  As that becomes easier, hold your urine for 10 minutes  Self-care:   · Keep a UI record  Write down how often you leak urine and how much you leak  Make a note of what you were doing when you leaked urine  · Drink liquids as directed  You may need to limit the amount of liquid you drink to help control your urine leakage  Do not drink any liquid right before you go to bed  Limit or do not have drinks that contain caffeine or alcohol  · Prevent constipation  Eat a variety of high-fiber foods  Good examples are high-fiber cereals, beans, vegetables, and whole-grain breads  Walking is the best way to trigger your intestines to have a bowel movement  · Exercise regularly and maintain a healthy weight  Weight loss and exercise will decrease pressure on your bladder and help you control your leakage  · Use a catheter as directed  to help empty your bladder  A catheter is a tiny, plastic tube that is put into your bladder to drain your urine  · Go to behavior therapy as directed  Behavior therapy may be used to help you learn to control your urge to urinate      Weight Management   Why it is important to manage your weight:  Being overweight increases your risk of health conditions such as heart disease, high blood pressure, type 2 diabetes, and certain types of cancer  It can also increase your risk for osteoarthritis, sleep apnea, and other respiratory problems  Aim for a slow, steady weight loss  Even a small amount of weight loss can lower your risk of health problems  How to lose weight safely:  A safe and healthy way to lose weight is to eat fewer calories and get regular exercise  You can lose up about 1 pound a week by decreasing the number of calories you eat by 500 calories each day  Healthy meal plan for weight management:  A healthy meal plan includes a variety of foods, contains fewer calories, and helps you stay healthy  A healthy meal plan includes the following:  · Eat whole-grain foods more often  A healthy meal plan should contain fiber  Fiber is the part of grains, fruits, and vegetables that is not broken down by your body  Whole-grain foods are healthy and provide extra fiber in your diet  Some examples of whole-grain foods are whole-wheat breads and pastas, oatmeal, brown rice, and bulgur  · Eat a variety of vegetables every day  Include dark, leafy greens such as spinach, kale, yulia greens, and mustard greens  Eat yellow and orange vegetables such as carrots, sweet potatoes, and winter squash  · Eat a variety of fruits every day  Choose fresh or canned fruit (canned in its own juice or light syrup) instead of juice  Fruit juice has very little or no fiber  · Eat low-fat dairy foods  Drink fat-free (skim) milk or 1% milk  Eat fat-free yogurt and low-fat cottage cheese  Try low-fat cheeses such as mozzarella and other reduced-fat cheeses  · Choose meat and other protein foods that are low in fat  Choose beans or other legumes such as split peas or lentils  Choose fish, skinless poultry (chicken or turkey), or lean cuts of red meat (beef or pork)  Before you cook meat or poultry, cut off any visible fat  · Use less fat and oil    Try baking foods instead of frying them  Add less fat, such as margarine, sour cream, regular salad dressing and mayonnaise to foods  Eat fewer high-fat foods  Some examples of high-fat foods include french fries, doughnuts, ice cream, and cakes  · Eat fewer sweets  Limit foods and drinks that are high in sugar  This includes candy, cookies, regular soda, and sweetened drinks  Exercise:  Exercise at least 30 minutes per day on most days of the week  Some examples of exercise include walking, biking, dancing, and swimming  You can also fit in more physical activity by taking the stairs instead of the elevator or parking farther away from stores  Ask your healthcare provider about the best exercise plan for you  © Copyright [employer] 2018 Information is for End User's use only and may not be sold, redistributed or otherwise used for commercial purposes   All illustrations and images included in CareNotes® are the copyrighted property of A D A M , Inc  or [address] [place] [address]

## 2022-07-25 DIAGNOSIS — R60.9 PERIPHERAL EDEMA: ICD-10-CM

## 2022-07-25 RX ORDER — FUROSEMIDE 40 MG/1
40 TABLET ORAL 2 TIMES DAILY
Qty: 90 TABLET | Refills: 3 | Status: SHIPPED | OUTPATIENT
Start: 2022-07-25

## 2022-09-21 DIAGNOSIS — I10 ESSENTIAL HYPERTENSION: ICD-10-CM

## 2022-09-21 RX ORDER — AMLODIPINE BESYLATE 10 MG/1
10 TABLET ORAL DAILY
Qty: 90 TABLET | Refills: 3 | Status: SHIPPED | OUTPATIENT
Start: 2022-09-21

## 2022-09-21 RX ORDER — CARVEDILOL 6.25 MG/1
6.25 TABLET ORAL 2 TIMES DAILY WITH MEALS
Qty: 180 TABLET | Refills: 3 | Status: SHIPPED | OUTPATIENT
Start: 2022-09-21

## 2022-12-30 ENCOUNTER — OFFICE VISIT (OUTPATIENT)
Dept: FAMILY MEDICINE CLINIC | Facility: CLINIC | Age: 87
End: 2022-12-30

## 2022-12-30 VITALS
RESPIRATION RATE: 18 BRPM | BODY MASS INDEX: 29.95 KG/M2 | WEIGHT: 169 LBS | SYSTOLIC BLOOD PRESSURE: 130 MMHG | DIASTOLIC BLOOD PRESSURE: 70 MMHG | HEIGHT: 63 IN | TEMPERATURE: 97.8 F | HEART RATE: 76 BPM | OXYGEN SATURATION: 98 %

## 2022-12-30 DIAGNOSIS — E78.2 MIXED HYPERLIPIDEMIA: ICD-10-CM

## 2022-12-30 DIAGNOSIS — R60.9 PERIPHERAL EDEMA: ICD-10-CM

## 2022-12-30 DIAGNOSIS — I10 ESSENTIAL HYPERTENSION: ICD-10-CM

## 2022-12-30 DIAGNOSIS — N18.32 STAGE 3B CHRONIC KIDNEY DISEASE (HCC): Primary | ICD-10-CM

## 2022-12-30 DIAGNOSIS — M19.90 OSTEOARTHRITIS, UNSPECIFIED OSTEOARTHRITIS TYPE, UNSPECIFIED SITE: ICD-10-CM

## 2022-12-30 DIAGNOSIS — I10 PRIMARY HYPERTENSION: ICD-10-CM

## 2022-12-30 RX ORDER — AMLODIPINE BESYLATE 10 MG/1
10 TABLET ORAL DAILY
Qty: 90 TABLET | Refills: 3 | Status: SHIPPED | OUTPATIENT
Start: 2022-12-30

## 2022-12-30 RX ORDER — CARVEDILOL 6.25 MG/1
6.25 TABLET ORAL 2 TIMES DAILY WITH MEALS
Qty: 180 TABLET | Refills: 3 | Status: SHIPPED | OUTPATIENT
Start: 2022-12-30

## 2022-12-30 RX ORDER — FUROSEMIDE 40 MG/1
40 TABLET ORAL 2 TIMES DAILY
Qty: 90 TABLET | Refills: 3 | Status: SHIPPED | OUTPATIENT
Start: 2022-12-30

## 2022-12-30 RX ORDER — POTASSIUM CHLORIDE 20 MEQ/1
20 TABLET, EXTENDED RELEASE ORAL 2 TIMES DAILY
Qty: 180 TABLET | Refills: 3 | Status: SHIPPED | OUTPATIENT
Start: 2022-12-30

## 2023-01-03 NOTE — PROGRESS NOTES
Name: Yvonne Duffy      : 1931      MRN: [id number]  Encounter Provider: Garo Irizarry MD  Encounter Date: 2022   Encounter department: [address] [place] [address]     1  Stage 3b chronic kidney disease ([facility] [address] )  -     Lipid panel; Future  -     Comprehensive metabolic panel; Future; Expected date: 2022  -     CBC and differential; Future  -     TSH, 3rd generation with Free T4 reflex; Future    2  Mixed hyperlipidemia  -     Lipid panel; Future  -     Comprehensive metabolic panel; Future; Expected date: 2022  -     CBC and differential; Future  -     TSH, 3rd generation with Free T4 reflex; Future    3  Essential hypertension  -     amLODIPine (NORVASC) 10 mg tablet; Take 1 tablet (10 mg total) by mouth daily  -     carvedilol (COREG) 6 25 mg tablet; Take 1 tablet (6 25 mg total) by mouth 2 (two) times a day with meals    4  Peripheral edema  -     furosemide (LASIX) 40 mg tablet; Take 1 tablet (40 mg total) by mouth 2 (two) times a day  -     potassium chloride (K-DUR,KLOR-CON) 20 mEq tablet; Take 1 tablet (20 mEq total) by mouth 2 (two) times a day    5  Primary hypertension    6  Osteoarthritis, unspecified osteoarthritis type, unspecified site           Subjective      Her blood pressure is well controlled on her current regimen  She has no chest pain or shortness of breath  She has no PND, orthopnea, or dyspnea on exertion  She has chronic pain from osteoarthritis  Unfortunately, she has stage III chronic kidney disease and is not a great candidate for nonsteroidal anti-inflammatories  She is taking Tylenol with minimal relief  She is not interested in physical therapy or any other pain medications at this time  Review of Systems   All other systems reviewed and are negative        Current Outpatient Medications on File Prior to Visit   Medication Sig   • Multiple Vitamins-Minerals (TGT MULTIVITAMIN/MULTIMINERAL) TABS Take 1 tablet by mouth in the morning         Objective     /70 (BP Location: Left arm, Patient Position: Sitting, Cuff Size: Standard)   Pulse 76   Temp 97 8 °F (36 6 °C) (Temporal)   Resp 18   Ht 5' 3" (1 6 m)   Wt 76 7 kg (169 lb)   SpO2 98%   BMI 29 94 kg/m²     Physical Exam  Vitals and nursing note reviewed  Constitutional:       Appearance: Normal appearance  Cardiovascular:      Rate and Rhythm: Normal rate and regular rhythm  Pulmonary:      Effort: Pulmonary effort is normal       Breath sounds: Normal breath sounds  Abdominal:      General: Abdomen is flat  Bowel sounds are normal       Palpations: Abdomen is soft  Musculoskeletal:         General: Normal range of motion  Cervical back: Normal range of motion and neck supple  Skin:     General: Skin is warm and dry  Capillary Refill: Capillary refill takes less than 2 seconds  Neurological:      General: No focal deficit present  Mental Status: She is alert and oriented to person, place, and time  Mental status is at baseline  Psychiatric:         Mood and Affect: Mood normal          Behavior: Behavior normal          Thought Content:  Thought content normal          Judgment: Judgment normal        Alanis Mims MD

## 2023-05-17 ENCOUNTER — TELEPHONE (OUTPATIENT)
Dept: FAMILY MEDICINE CLINIC | Facility: CLINIC | Age: 88
End: 2023-05-17

## 2023-05-17 NOTE — TELEPHONE ENCOUNTER
[facility] calling stating they started care today and stated she hasn't been taking her amlodipine   Her bp was normal

## 2023-05-24 ENCOUNTER — TELEPHONE (OUTPATIENT)
Dept: FAMILY MEDICINE CLINIC | Facility: CLINIC | Age: 88
End: 2023-05-24

## 2023-05-24 ENCOUNTER — OFFICE VISIT (OUTPATIENT)
Dept: FAMILY MEDICINE CLINIC | Facility: CLINIC | Age: 88
End: 2023-05-24

## 2023-05-24 VITALS
OXYGEN SATURATION: 96 % | BODY MASS INDEX: 30.19 KG/M2 | WEIGHT: 170.4 LBS | TEMPERATURE: 98.5 F | RESPIRATION RATE: 13 BRPM | HEART RATE: 77 BPM | DIASTOLIC BLOOD PRESSURE: 67 MMHG | SYSTOLIC BLOOD PRESSURE: 148 MMHG

## 2023-05-24 DIAGNOSIS — N18.32 STAGE 3B CHRONIC KIDNEY DISEASE (HCC): ICD-10-CM

## 2023-05-24 DIAGNOSIS — I35.0 MODERATE AORTIC VALVE STENOSIS: ICD-10-CM

## 2023-05-24 DIAGNOSIS — R01.1 HEART MURMUR: ICD-10-CM

## 2023-05-24 DIAGNOSIS — I10 PRIMARY HYPERTENSION: ICD-10-CM

## 2023-05-24 DIAGNOSIS — Z09 HOSPITAL DISCHARGE FOLLOW-UP: Primary | ICD-10-CM

## 2023-05-24 NOTE — PROGRESS NOTES
Assessment & Plan     1  Stage 3b chronic kidney disease ([facility] [address] )  - stable  Labs reviewed  Avoid nephrotoxic agents  2  Hospital discharge follow-up  Reviewed notes/labs/diagnostics  3  Moderate aortic valve stenosis  Significant murmur  She is not with worsening sob/pavon/edema  She is not longer on Lasix and has remained euvolemic  Monitor closely  4  Heart murmur    5  Primary hypertension  - stable  Off Norvasc  Her Bps were running too low  She is on carvedilol bid  Continue  Return for Keep f/u in June for [facility] with Dr Vikram Grant   Patient/Caretaker verbalized understanding and were in agreement with today's assessment and plan  Time was taken to address any questions patient/caretaker had  BMI Counseling: Body mass index is 30 19 kg/m²  The BMI is above normal  Nutrition recommendations include decreasing portion sizes, encouraging healthy choices of fruits and vegetables, decreasing fast food intake, consuming healthier snacks, limiting drinks that contain sugar, reducing intake of saturated and trans fat and reducing intake of cholesterol  Exercise recommendations include exercising 3-5 times per week and strength training exercises  Rationale for BMI follow-up plan is due to patient being overweight or obese  Chief Complaint   Patient presents with   • Transition of Care Management       Subjective     Transitional Care Management Review:   Jonathon Spurling is a 80 y o  female here for TCM follow up  Got out of Nursing Home last Tuesday, 5/16  She lives next door to her son  She is doing home PT/OT  She went to NH from ED after she fell on 4/5  She had fractured ribs - per patient  She is doing HEP as well  Her son helps with groceries and cooking and bill paying  She has no steps in the home  She is not with sob/pavon that is worse from her baseline  There is no edema  She has no other c/o's today  Echo 2020: Aortic Valve:  There is mild regurgitation  •  Aortic Valve: There is moderate to severe stenosis  Mean gradient is   approximately 31 mmHg   Dimensionless index is approximately 0 33  Valve   area is approximately 0 8-1 cm2  •  Mitral Valve: There is mild stenosis  Left Ventricle   Left ventricle is normal in size  There is moderate hypertrophy  Systolic function is normal with an ejection fraction of 55-60%  Wall motion is within normal limits  There is grade I (mild) diastolic dysfunction  During the TCM phone call patient stated:  TCM Call     Date and time call was made  1/8/2020  6:17 PM    Patient was hospitialized at  Other (comment)    [facility]     Date of Admission  12/20/19    Date of discharge  01/07/20    Diagnosis  Closed fracture of multiple pubic rami, right,     Disposition  Rehabilitation center; Home      TCM Call     Scheduled for follow up? Yes    Is transportation to your appointment needed  Yes    Have you fallen in the last 12 months  Yes    Interperter language line needed  No        HPI  Review of Systems   All other systems reviewed and are negative  Objective     /67 (BP Location: Left arm, Patient Position: Sitting, Cuff Size: Standard)   Pulse 77   Temp 98 5 °F (36 9 °C) (Tympanic)   Resp 13   Wt 77 3 kg (170 lb 6 4 oz)   SpO2 96%   BMI 30 19 kg/m²      Physical Exam  Vitals and nursing note reviewed  Constitutional:       Appearance: She is not ill-appearing  HENT:      Head: Normocephalic and atraumatic  Eyes:      Conjunctiva/sclera: Conjunctivae normal    Cardiovascular:      Rate and Rhythm: Normal rate  Heart sounds: Murmur (3/6 systolic murmur heard throughout ) heard  Pulmonary:      Effort: Pulmonary effort is normal       Breath sounds: Normal breath sounds  Musculoskeletal:      Cervical back: Neck supple  Comments: She is using her rolling walker      She is slow/antalgic gait     Lymphadenopathy:      Cervical: No cervical adenopathy  Skin:     General: Skin is warm and dry  Neurological:      General: No focal deficit present  Mental Status: She is alert and oriented to person, place, and time  Psychiatric:         Mood and Affect: Mood normal          Behavior: Behavior normal          Thought Content:  Thought content normal          Judgment: Judgment normal        Medications have been reviewed by provider in current encounter    Ravi  [address], DO

## 2023-06-22 ENCOUNTER — TELEPHONE (OUTPATIENT)
Dept: FAMILY MEDICINE CLINIC | Facility: CLINIC | Age: 88
End: 2023-06-22

## 2023-06-22 NOTE — TELEPHONE ENCOUNTER
Nurse from [facility] health calling to let you know that she is having a hard time getting a hold of the pt   She is doing a missed skilled nursing visit since she cannot reach her

## 2023-06-30 ENCOUNTER — OFFICE VISIT (OUTPATIENT)
Dept: FAMILY MEDICINE CLINIC | Facility: CLINIC | Age: 88
End: 2023-06-30
Payer: MEDICARE

## 2023-06-30 VITALS
SYSTOLIC BLOOD PRESSURE: 114 MMHG | HEART RATE: 135 BPM | BODY MASS INDEX: 30.96 KG/M2 | TEMPERATURE: 98.1 F | RESPIRATION RATE: 16 BRPM | WEIGHT: 174.8 LBS | DIASTOLIC BLOOD PRESSURE: 64 MMHG | OXYGEN SATURATION: 98 %

## 2023-06-30 DIAGNOSIS — I50.9 EDEMA DUE TO CONGESTIVE HEART FAILURE (HCC): ICD-10-CM

## 2023-06-30 DIAGNOSIS — I10 PRIMARY HYPERTENSION: ICD-10-CM

## 2023-06-30 DIAGNOSIS — E11.22 TYPE 2 DIABETES MELLITUS WITH STAGE 4 CHRONIC KIDNEY DISEASE, WITHOUT LONG-TERM CURRENT USE OF INSULIN (HCC): ICD-10-CM

## 2023-06-30 DIAGNOSIS — E78.2 MIXED HYPERLIPIDEMIA: ICD-10-CM

## 2023-06-30 DIAGNOSIS — N18.4 TYPE 2 DIABETES MELLITUS WITH STAGE 4 CHRONIC KIDNEY DISEASE, WITHOUT LONG-TERM CURRENT USE OF INSULIN (HCC): ICD-10-CM

## 2023-06-30 DIAGNOSIS — N18.32 STAGE 3B CHRONIC KIDNEY DISEASE (HCC): Primary | ICD-10-CM

## 2023-07-01 NOTE — PROGRESS NOTES
Name: Rosalina Nazario      : 1931      MRN: [id number]  Encounter Provider: Anat Mtz MD  Encounter Date: 2023   Encounter department: [address] [place] [address]  Stage 3b chronic kidney disease (HCC)  -     CBC and differential; Future  -     Comprehensive metabolic panel; Future; Expected date: 2023  -     TSH, 3rd generation with Free T4 reflex; Future    2  Primary hypertension  -     CBC and differential; Future  -     Comprehensive metabolic panel; Future; Expected date: 2023  -     TSH, 3rd generation with Free T4 reflex; Future    3  Mixed hyperlipidemia  -     CBC and differential; Future  -     Comprehensive metabolic panel; Future; Expected date: 2023  -     TSH, 3rd generation with Free T4 reflex; Future    4  Edema due to congestive heart failure ([facility] [address] )    5  Type 2 diabetes mellitus with stage 4 chronic kidney disease, without long-term current use of insulin (HCC)       Her HR is 135 bpm and she has relatively low BP, fatigue, and RAMOS  I referred her to the ER for further evaluation and management  Subjective      Her resting heart rate is 135  She c/o fatigue and RAMOS  Review of Systems   Constitutional: Positive for activity change and fatigue  Cardiovascular: Positive for palpitations  All other systems reviewed and are negative        Current Outpatient Medications on File Prior to Visit   Medication Sig   • carvedilol (COREG) 6 25 mg tablet Take 1 tablet (6 25 mg total) by mouth 2 (two) times a day with meals   • Multiple Vitamins-Minerals (TGT MULTIVITAMIN/MULTIMINERAL) TABS Take 1 tablet by mouth in the morning     • potassium chloride (K-DUR,KLOR-CON) 20 mEq tablet Take 1 tablet (20 mEq total) by mouth 2 (two) times a day (Patient not taking: Reported on 2023)       Objective     /64   Pulse (!) 135   Temp 98 1 °F (36 7 °C) (Temporal)   Resp 16   Wt 79 3 kg (174 lb 12 8 oz)   SpO2 98%   BMI 30 96 kg/m²     Physical Exam  Vitals and nursing note reviewed  Constitutional:       Appearance: Normal appearance  Cardiovascular:      Rate and Rhythm: Regular rhythm  Tachycardia present  Pulses: Normal pulses  Heart sounds: Normal heart sounds  Pulmonary:      Effort: Pulmonary effort is normal       Breath sounds: Normal breath sounds  Abdominal:      General: Abdomen is flat  Bowel sounds are normal       Palpations: Abdomen is soft  Musculoskeletal:      Cervical back: Normal range of motion and neck supple  Neurological:      Mental Status: She is alert         Tracy Douglass MD

## 2023-07-12 ENCOUNTER — TELEPHONE (OUTPATIENT)
Dept: FAMILY MEDICINE CLINIC | Facility: CLINIC | Age: 88
End: 2023-07-12

## 2023-07-12 NOTE — TELEPHONE ENCOUNTER
[facility]-[phone] requesting a letter to resume Home health services July 15

## 2023-07-12 NOTE — LETTER
To Whom It May Concern      Please resume all services that patient was getting with [facility] with a start date of 7/15/23.        Please call the office with any questions [phone]        Thank you          Haroldo Ba

## 2023-07-12 NOTE — LETTER
To Whom It May Concern      Please resume all services that patient, Mayte Burton 8/1/1931, was getting with [facility] with a start date of 7/15/23.        Please call the office with any questions [phone]        Thank you          Rohit Cantrell

## 2023-07-21 ENCOUNTER — OFFICE VISIT (OUTPATIENT)
Dept: FAMILY MEDICINE CLINIC | Facility: CLINIC | Age: 88
End: 2023-07-21
Payer: MEDICARE

## 2023-07-21 VITALS
TEMPERATURE: 98.3 F | HEART RATE: 69 BPM | BODY MASS INDEX: 28.88 KG/M2 | WEIGHT: 163 LBS | SYSTOLIC BLOOD PRESSURE: 112 MMHG | DIASTOLIC BLOOD PRESSURE: 80 MMHG | OXYGEN SATURATION: 99 % | HEIGHT: 63 IN

## 2023-07-21 DIAGNOSIS — Z09 HOSPITAL DISCHARGE FOLLOW-UP: Primary | ICD-10-CM

## 2023-07-21 DIAGNOSIS — I35.0 NONRHEUMATIC AORTIC VALVE STENOSIS: ICD-10-CM

## 2023-07-21 PROCEDURE — 99495 TRANSJ CARE MGMT MOD F2F 14D: CPT | Performed by: FAMILY MEDICINE

## 2023-07-21 RX ORDER — PANTOPRAZOLE SODIUM 40 MG/1
TABLET, DELAYED RELEASE ORAL
COMMUNITY
Start: 2023-05-16

## 2023-07-21 RX ORDER — FUROSEMIDE 40 MG/1
TABLET ORAL
COMMUNITY
Start: 2023-07-11

## 2023-07-21 RX ORDER — METOPROLOL SUCCINATE 50 MG/1
TABLET, EXTENDED RELEASE ORAL
COMMUNITY
Start: 2023-07-11

## 2023-07-21 NOTE — PROGRESS NOTES
Assessment & Plan     1. Hospital discharge follow-up        2. Nonrheumatic aortic valve stenosis          She has adequate Cardiology f/u and ultimately, this is what needs to be done. Her weight is down and she is not with acute issues here today. Her discharge information was reviewed. Return for keep f/u with Dr. Kaia Carrillo . Patient/Caretaker verbalized understanding and were in agreement with today's assessment and plan. Time was taken to address any questions patient/caretaker had. Indication/Risks/Benefits of medication(s) as prescribed were discussed with the patient/caretaker. The patient verbalized understanding and agreement and elects to take medications as prescribed. Time was taken to answer any questions the patient/caretaker may have had. Chief Complaint   Patient presents with   • Follow-up     Recent ER visit. She states the medication- LASIX- is not helping her. • Shortness of Breath     She is SOB upon exertion. Subjective     Transitional Care Management Review:   Peewee Garcia is a 80 y.o. female here for TCM follow up. Here today and is stable. She has adequate f/u with Cardiology. Cont to have sob/pavon but nothing above her baseline. She has lost weight and is taking her medicines. No acute issues today.       Encounter Details    Encounter Details  Date Type Department Care Team Description   07/06/2023 - 07/11/2023 SHARI BUSH - BARBARA Encounter [facility]    [address], [place] [address] 797 Cherelle Huston MD    [address], [place] [address] [phone] James Zhu   [phone] ([address] [place], [place] [address]Kayley MD    [facility]    [address]    [address]    [phone] ([place] Bijan Donahue MD    [address], [place] [address] [phone] James Zhu   [phone] (Fax)   Acute diastolic congestive heart failure (HCC) (Primary Dx)     Social History  - documented as of this encounter  Social History  Tobacco Use Types Packs/Day Years Used Date   Smoking Tobacco: Never           Smokeless Tobacco: Never             Social History  Alcohol Use Standard Drinks/Week Comments   Not Currently 0 (1 standard drink = 0.6 oz pure alcohol)       Social History  Housing Stability Answer Date Recorded   In the last 12 months, was there a time when you were not able to pay the mortgage or rent on time? Patient refused 07/06/2023   In the last 12 months, how many places have you lived? 1 07/06/2023   In the last 12 months, was there a time when you did not have a steady place to sleep or slept in a shelter (including now)?  Patient refused 07/06/2023     Social History  Sex Assigned at [facility] Date Recorded   Not on file       Last Filed Vital Signs  - documented in this encounter  Last Filed Vital Signs  Vital Sign Reading Time Taken Comments   Blood Pressure 109/53 07/11/2023 11:01 AM EDT     Pulse 84 07/11/2023 12:00 PM EDT     Temperature 36.5 °C (97.7 °F) 07/11/2023 11:01 AM EDT     Respiratory Rate 19 07/11/2023 11:01 AM EDT     Oxygen Saturation 95% 07/11/2023 11:01 AM EDT     Inhaled Oxygen Concentration - -     Weight 76.6 kg (168 lb 14 oz) 07/11/2023 5:47 AM EDT     Height 157.5 cm (5' 2") 07/06/2023 5:00 AM EDT     Body Mass Index 30.89 07/06/2023 5:00 AM EDT       Discharge Summaries  - documented in this encounter  Francois Christianson DO - 07/11/2023 1:55 PM EDT  Formatting of this note is different from the original.  [address]    Admitting Provider: Song Nash MD  Discharging Provider: Carlos Tran*  Primary Care Physician at Discharge: Florentin Barillas -[phone]   Admission Date: 7/6/2023   Discharge Date: July 11, 2023  Discharged From: [facility]    Admission Diagnosis  Aortic stenosis [I35.0]  Heart failure ([address]) [I50.9]    Discharge Diagnosis  Active Hospital Problems   Diagnosis Date Noted   Heart failure ([address] [place] [address]) 07/06/2023   Atrial flutter ([address] [place] [address]) 06/30/2023   Stage 3 chronic kidney disease ([address] [place] [address]) 12/20/2019   Diabetes mellitus type II, controlled ([address] [place] [address]) 08/24/2015     Resolved Hospital Problems   No resolved problems to display. Hospital Course  Presentation: Patient presented to the hospital due to worsening shortness of breath. She was found to have acute heart failure exacerbation secondary to aortic stenosis that was associated with new onset atrial flutter. Work-up: Echo on 06/30 showed preserved ejection fraction of 50%. There was a critical stenosis aortic valve. The atrium delete left atrium appeared dilated. Cath on July 10 showed mild to moderate nonobstructive coronary disease, severe pulmonary hypertension with mean PASP of 46 mm Hg, elevated left-sided filling pressures with mean PCWP of 25 mm Hg. Meds:   Start Eliquis 2.5 mg p.o. twice daily  Start Lasix 40 mg p.o. every morning  Start metoprolol 50 mg p.o. daily    Follow-up:  1. Follow-up with TAVR team, will need further outpatient imaging for TAVR work up. 2. PCP follow-up within 1 week. 3. Will need a BNP in 1 week. Medications    Medication List       START taking these medications     apixaban 2.5 mg Tab  Commonly known as: ELIQUIS  Take 1 tablet (2.5 mg total) by mouth 2 (two) times a day. furosemide 40 MG tablet  Commonly known as: LASIX  Take 1 tablet (40 mg total) by mouth daily. Start taking on: July 12, 2023    metoprolol 50 MG 24 hr tablet  Commonly known as: TOPROL-XL  Take 3 tablets (150 mg total) by mouth daily. Start taking on: July 12, 2023          Denae Sorto taking these medications     acetaminophen 500 MG tablet  Commonly known as: TYLENOL  Take 1 tablet (500 mg total) by mouth every 6 (six) hours as needed for mild pain (pain score 1-3).           STOP taking these medications     amLODIPine 10 MG tablet  Commonly known as: NORVASC    carvediloL 6.25 MG tablet  Commonly known as: COREG    pantoprazole 40 MG tablet  Commonly known as: PROTONIX            Where to Get Your Medications       These medications were sent to [facility] - [address] [place] [address]     Phone: [phone]   apixaban 2.5 mg Tab  furosemide 40 MG tablet  metoprolol 50 MG 24 hr tablet      Allergies  Allergies   Allergen Reactions   Atorvastatin Muscle pain/cramps   Muscle aches   Morphine   Tetanus Vaccines And Toxoid   Pravastatin Rash     Outpatient Follow-Up  Future Appointments   Date Time Provider [address]   7/26/2023 10:40 AM Rodolfo Elliott PA-C [facility] MARIA ANSARI     Active Issues Requiring Follow-Up  Issue: TAVR  Responsible Individual: Patient  What is Needed: TAVR outpatient visit and imaging. Follow-up Appointments Arranged: Yes     Test Results Pending at Discharge    Labs and Other Follow-ups after Discharge     BMP (Basic Metabolic Panel) - A group of tests to examine the current status of your kidneys, blood sugar, electrolytes and acid/base balance.  Jul 18, 2023 (Approximate)    Route to Hien Sunshine MD         Discharge Disposition  home    Treatments  cardiac meds: metoprolol and furosemide, therapies: PT and OT, and procedures: LHC and RHC    Consults  CTS    Procedures   None    Operative Procedures Performed  Procedure(s):  Right heart cath  Coronary angiography    Pertinent Test Results  cardiac graphics: Echocardiogram: Severe AS    Physical Exam at Discharge  Condition of Patient on Discharge: good  Physical Exam  See progress note from day of d/c    Time  The above discharge including evaluating the patient took greater than 30 minutes, more than 50% of it was spent counseling the patient and the patient's son and coordinating care by discussing treatment plans and follow ups status post discharge    Robert Sapp DO    Electronically signed by Aidan Martin MD at 07/11/2023 2:53 PM EDT    Associated attestation - Aidan Martin MD - 07/11/2023 2:53 PM EDT  Formatting of this note might be different from the original.  I personally saw and examined the patient on (7/11/2023), have reviewed the detailed history, clinical data and findings and agree with assessment and plan as documented by Phuc Frye to include my comments as noted below     Summary     35-year-old woman with a history of severe aortic stenosis, history of syncope in May 2023, newly diagnosed atrial flutter, diabetes, GERD, CKD who was admitted with acute decompensated heart failure now undergoing TAVR evaluation. 7/10/2023 left and right heart catheterization at a weight of 172 pounds displayed mean pulmonary capillary wedge pressure of 25 mmHg, RA pressure of 13 mmHg, pulmonary hypertension with a mean PA pressure of 46 mmHg and nonobstructive coronary artery disease. Today   Constitutional: Elderly, sitting in bed and eating lunch. Chronically ill-appearing. Eyes: No pallor or icterus   Neck: Neck supple. No JVD or Carotid bruit . Cardiovascular: Harsh systolic murmur. Abdomen : Non distended No tenderness, no organomegaly. Musculoskeletal: Trace symmetric peripheral edema. Skin: warm  Neurological: alert and oriented to person, place, and time. Non focal   Psychiatric: Appropriate mood and affect. speech normal .       Data:  Personally reviewed blood work, and imaging   Personally reviewed cardiac diagnostics       Diagnosis     Acute decompensated heart failure  Rate controlled atrial flutter  Nonobstructive coronary artery disease 7/10/2023 coronary angiogram  Mixed pulmonary hypertension (mean PA pressure of 46 with a wedge of 25 mmHg 7/10/2023 catheter)  Severe aortic stenosis  Diabetes  CKD      Plan     Pressure 7/10/2023 right and left heart cath. Nonobstructive coronary artery disease. More euvolemic on examination today.  Dry weight felt to be around 168 pounds. Plan for outpatient TAVR CT followed by TAVR evaluation. TAVR team to contact patient to coordinate scheduling. Apixaban 2.5 mg twice daily, Lasix 40 mg daily, metoprolol succinate 150 mg daily. Resume dose reduced NOAC for new onset atrial flutter. Currently rate controlled. Rhythm control precluded by impending TAVR. Rhythm control can be assessed post TAVR. Zaynab Gallo MD  [facility] cardiology   Pager [phone]    Total time spent: 35 minutes were utilized in the care of the patient, with more than 50% spent counseling, coordinating care, through direct chart review, independent review of studies, discussion with referring providers, and in direct patient contact. The patient is in agreement with the above plan. Discharge Instructions  - documented in this encounter  Discharge Instructions  PAULA Antony - 07/06/2023 2:25 PM EDT    Formatting of this note is different from the original.  Images from the original note were not included. INTERNAL MEDICINE DISCHARGE INSTRUCTIONS  Patient: Walter Dee Patient MRN: [id number]  This document highlights important facts about your hospital stay and follow-up care. More details and helpful resources can be found in the Summary of Your Hospitalization. Admitted on: 7/6/2023   Discharged on: 7/11/2023  Primary Care Physician: Priscilla Diez -[phone]   Your Discharging Provider(s): PAULA Vo and Rakesh Santiago*   Your Admission Symptom(s): Shortness of breath  Your Main Diagnoses: Principal Problem:  Heart failure [facility])  Active Problems:  Stage 3 chronic kidney disease ([address] [facility])  Diabetes mellitus type II, controlled ([address] [facility])  Atrial flutter ([address] [facility])      Reasons for Hospitalization    You were admitted to [facility] on 7/6/2023 for heart failure that was due to a bad aortic heart valve. You will need an outpatient imaging for further work-up of the TAVR. You were discharged home on 7/11/2023. Please take your medications as instructed upon discharge, being mindful of which medications you should continue, which medications are new, and which medications were discontinued. Please also follow-up with your primary care provider within 1 week by calling for an appointment in order to transition your medical care from the hospital to the outpatient setting. If your symptoms do not resolve or if you develop any new or worsening symptoms, please go directly to the emergency department. Follow up with TAVR team as an outpatient. Follow-up with cardiology within 1 week. Follow-up with PCP with 1 week. Medication changes    Started  Start Eliquis 2.5 mg p.o. twice daily  Start Lasix 40 mg p.o. every morning  Start metoprolol 50 mg p.o. daily    Stopped  1. Stop taking amlodipine 10 mg p.o. daily  2. Stop taking Carvedilol 6.25   3. Stop taking Protonix 40 mg p.o. daily. Recommended outpatient tests  Please bring this sheet to your primary care provider and ask her/him to order the following tests: A BNP lab test should be completed in 1 week. Recommended follow-up examinations  Please schedule an appointment with the following provider(s) to make sure your recovery is going well and any new or ongoing symptoms are addressed. If you do not have a primary care provider, please call [phone] for help finding one. Jasmina Nesbitt MD  [facility] [place],  [address] [place] [address]  [phone]    Follow up  Please follow up with PCP in 1-2 weeks. Questions once you’re home? About your discharge instructions: Your primary care provider will resume responsibility for your care once you leave the hospital, so he/she will receive a summary of your hospital visit. If you have any questions about your discharge instructions before you visit this provider for a follow-up examination, please feel free to call Hospital Medicine at [phone] [facility]).   Medication refills:  Please obtain any refill prescriptions from your primary care provider. Your feedback is very important to us  We hope you had a positive experience at [facility]. It was our privilege to care for you, and we trust that your provider always explained things clearly, listened to you carefully and treated you with courtesy and respect. If you receive a survey about your hospital visit, we would greatly appreciate you filling it out. It’s important for us to know what we’re doing well and if there’s something we can do even better. Best wishes for your continued recovery. Sincerely,    Your Internal Medicine Resident Team    Living With Heart Failure  Heart failure is a long-term (chronic) condition in which the heart cannot pump enough blood through the body. When this happens, parts of the body do not get the blood and oxygen they need. There is no cure for heart failure at this time, so it is important for you to take good care of yourself and follow the treatment plan set by your health care provider. If you are living with heart failure, there are ways to help you manage the disease. Follow these instructions at home:  Living with heart failure requires you to make changes in your life. Your health care team will teach you about the changes you need to make in order to relieve your symptoms and lower your risk of going to the hospital. Follow the treatment plan as set by your health care provider. Medicines  Medicines are important in reducing your heart's workload, slowing the progression of heart failure, and improving your symptoms. Take over-the-counter and prescription medicines only as told by your health care provider. Do not stop taking your medicine unless your health care provider tells you to do that. Do not skip any dose of your medicine. Refill prescriptions before you run out of medicine. You need your medicines every day. Eating and drinking    Eat heart-healthy foods.  Talk with a dietitian to make an eating plan that is right for you. If directed by your health care provider:  Limit salt (sodium). Lowering your sodium intake may reduce symptoms of heart failure. Ask a dietitian to recommend heart-healthy seasonings. Limit your fluid intake. Fluid restriction may reduce symptoms of heart failure. Use low-fat cooking methods instead of frying. Low-fat methods include roasting, grilling, broiling, baking, poaching, steaming, and stir-frying. Choose foods that contain no trans fat and are low in saturated fat and cholesterol. Healthy choices include fresh or frozen fruits and vegetables, fish, lean meats, legumes, fat-free or low-fat dairy products, and whole-grain or high-fiber foods. Limit alcohol intake to no more than 1 drink a day for nonpregnant women and 2 drinks a day for men. One drink equals 12 oz of beer, 5 oz of wine, or 1½ oz of hard liquor. Drinking more than that is harmful to your heart. Tell your health care provider if you drink alcohol several times a week. Talk with your health care provider about whether any level of alcohol use is safe for you. Activity    Ask your health care provider about attending cardiac rehabilitation. These programs include aerobic physical activity, which provides many benefits for your heart. If no cardiac rehabilitation program is available, ask your health care provider what aerobic exercises are safe for you to do. Lifestyle  Make the lifestyle changes recommended by your health care provider. In general:  Lose weight if your health care provider tells you to do that. Weight loss may reduce symptoms of heart failure. Do not use any products that contain nicotine or tobacco, such as cigarettes or e-cigarettes. If you need help quitting, ask your health care provider. Do not use street (illegal) drugs. Return to your normal activities as told by your health care provider. Ask your health care provider what activities are safe for you.   General instructions    Make sure you weigh yourself every day to track your weight. Rapid weight gain may indicate an increase in fluid in your body and may increase the workload of your heart. Weigh yourself every morning. Do this after you urinate but before you eat breakfast.  Wear the same type of clothing, without shoes, each time you weigh yourself. Weigh yourself on the same scale and in the same spot each time. Living with chronic heart failure often leads to emotions such as fear, stress, anxiety, and depression. If you feel any of these emotions and need help coping, contact your health care provider. Other ways to get help include:  Talking to friends and family members about your condition. They can give you support and guidance. Explain your symptoms to them and, if comfortable, invite them to attend appointments or rehabilitation with you. Joining a support group for people with chronic heart failure. Talking with other people who have the same symptoms may give you new ways of coping with your disease and your emotions. Stay up to date with your shots (vaccines). Staying current on pneumococcal and influenza vaccines is especially important in preventing germs from attacking your airways (respiratory infections). Keep all follow-up visits as told by your health care provider. This is important. How to recognize changes in your condition  You and your family members need to know what changes to watch for in your condition. Watch for the following changes and report them to your health care provider:  Sudden weight gain. Ask your health care provider what amount of weight gain to report. Shortness of breath:  Feeling short of breath while at rest, with no exercise or activity that required great effort. Feeling breathless with activity. Swelling of your lower legs or ankles. Difficulty sleeping:  You wake up feeling short of breath.   You have to use more pillows to raise your head in order to sleep.  Frequent, dry, hacking cough. Loss of appetite. Feeling more tired all the time. Depression or feelings of sadness or hopelessness. Bloating in the stomach. Where to find more information  Local support groups. Ask your health care provider about groups near you. The American Heart Association: www.heart. org  Contact a health care provider if:  You have a rapid weight gain. You have increasing shortness of breath that is unusual for you. You are unable to participate in your usual physical activities. You tire easily. You cough more than normal, especially with physical activity. You have any swelling or more swelling in areas such as your hands, feet, ankles, or abdomen. You feel like your heart is beating quickly (palpitations). You become dizzy or light-headed when you stand up. Get help right away if:  You have difficulty breathing. You notice or your family notices a change in your awareness, such as having trouble staying awake or having difficulty with concentration. You have pain or discomfort in your chest.  You have an episode of fainting (syncope). Summary  There is no cure for heart failure, so it is important for you to take good care of yourself and follow the treatment plan set by your health care provider. Medicines are important in reducing your heart's workload, slowing the progression of heart failure, and improving your symptoms. Living with chronic heart failure often leads to emotions such as fear, stress, anxiety, and depression. If you are feeling any of these emotions and need help coping, contact your health care provider. This information is not intended to replace advice given to you by your health care provider. Make sure you discuss any questions you have with your health care provider.   Document Revised: 10/22/2020 Document Reviewed: 05/02/2018  Elsevier Patient Education © 2021 [address] [place] [address].  ==========================  Patient Instructions for Care of Congestive Heart Failure (CHF)    Record daily weights same time of day (morning) with same clothing. Please call your provider if you have a 2- 3 pound weight gain in one day or a 5 pound weight gain in one week. Please call the office with any worsening shortness of breath, leg swelling, abdominal fullness, cough, difficulty lying down due to shortness of breath, and/or use of more pillows at night to help you breathe. Also report lightheadedness or dizziness and new nausea or vomiting without identified cause. Nutrition recommendation is for a heart-healthy, low sodium (salt) 2 gram (2000 mg) daily diet. Maintain a fluid restriction of 64 oz (2 quarts) unless otherwise instructed by your provider. Take all of your medications as prescribed. Avoid tobacco, excess alcohol use and strive to maintain recommended weight. Daily exercise is recommended, slowly increasing activity levels as tolerated.  ===================    Low-Sodium Eating Plan  Sodium, which is an element that makes up salt, helps you maintain a healthy balance of fluids in your body. Too much sodium can increase your blood pressure and cause fluid and waste to be held in your body. Your health care provider or dietitian may recommend following this plan if you have high blood pressure (hypertension), kidney disease, liver disease, or heart failure. Eating less sodium can help lower your blood pressure, reduce swelling, and protect your heart, liver, and kidneys. What are tips for following this plan? Reading food labels  The Nutrition Facts label lists the amount of sodium in one serving of the food. If you eat more than one serving, you must multiply the listed amount of sodium by the number of servings. Choose foods with less than 140 mg of sodium per serving. Avoid foods with 300 mg of sodium or more per serving.   Shopping    Look for lower-sodium products, often labeled as "low-sodium" or "no salt added."  Always check the sodium content, even if foods are labeled as "unsalted" or "no salt added."  Buy fresh foods. Avoid canned foods and pre-made or frozen meals. Avoid canned, cured, or processed meats. Buy breads that have less than 80 mg of sodium per slice. Cooking    Eat more home-cooked food and less restaurant, buffet, and fast food. Avoid adding salt when cooking. Use salt-free seasonings or herbs instead of table salt or sea salt. Check with your health care provider or pharmacist before using salt substitutes. Cook with plant-based oils, such as canola, sunflower, or olive oil. Meal planning  When eating at a restaurant, ask that your food be prepared with less salt or no salt, if possible. Avoid dishes labeled as brined, pickled, cured, smoked, or made with soy sauce, miso, or teriyaki sauce. Avoid foods that contain MSG (monosodium glutamate). MSG is sometimes added to [place] food, bouillon, and some canned foods. Make meals that can be grilled, baked, poached, roasted, or steamed. These are generally made with less sodium. General information  Most people on this plan should limit their sodium intake to 1,500-2,000 mg (milligrams) of sodium each day. What foods should I eat? Fruits  Fresh, frozen, or canned fruit. Fruit juice. Vegetables  Fresh or frozen vegetables. "No salt added" canned vegetables. "No salt added" tomato sauce and paste. Low-sodium or reduced-sodium tomato and vegetable juice. Grains  Low-sodium cereals, including oats, puffed wheat and rice, and shredded wheat. Low-sodium crackers. Unsalted rice. Unsalted pasta. Low-sodium bread. Whole-grain breads and whole-grain pasta. Meats and other proteins  Fresh or frozen (no salt added) meat, poultry, seafood, and fish. Low-sodium canned tuna and salmon. Unsalted nuts. Dried peas, beans, and lentils without added salt. Unsalted canned beans. Eggs. Unsalted nut butters. Dairy  Milk. Soy milk.  Cheese that is naturally low in sodium, such as ricotta cheese, fresh mozzarella, or Swiss cheese. Low-sodium or reduced-sodium cheese. Cream cheese. Yogurt. Seasonings and condiments  Fresh and dried herbs and spices. Salt-free seasonings. Low-sodium mustard and ketchup. Sodium-free salad dressing. Sodium-free light mayonnaise. Fresh or refrigerated horseradish. Lemon juice. Vinegar. Other foods  Homemade, reduced-sodium, or low-sodium soups. Unsalted popcorn and pretzels. Low-salt or salt-free chips. The items listed above may not be a complete list of foods and beverages you can eat. Contact a dietitian for more information. What foods should I avoid? Vegetables  Sauerkraut, pickled vegetables, and relishes. Ellcecilia Civ[place]. [place] fries. Onion rings. Regular canned vegetables (not low-sodium or reduced-sodium). Regular canned tomato sauce and paste (not low-sodium or reduced-sodium). Regular tomato and vegetable juice (not low-sodium or reduced-sodium). Frozen vegetables in sauces. Grains  Instant hot cereals. Bread stuffing, pancake, and biscuit mixes. Croutons. Seasoned rice or pasta mixes. Noodle soup cups. Boxed or frozen macaroni and cheese. Regular salted crackers. Self-rising flour. Meats and other proteins  Meat or fish that is salted, canned, smoked, spiced, or pickled. Precooked or cured meat, such as sausages or meat loaves. [place]. Ham. Pepperoni. Hot dogs. Corned beef. Chipped beef. Salt pork. Jerky. Pickled herring. Anchovies and sardines. Regular canned tuna. Salted nuts. Dairy  Processed cheese and cheese spreads. Hard cheeses. Cheese curds. Blue cheese. Feta cheese. String cheese. Regular cottage cheese. Buttermilk. Canned milk. Fats and oils  Salted butter. Regular margarine. Ghee. Byrne fat. Seasonings and condiments  Onion salt, garlic salt, seasoned salt, table salt, and sea salt. Canned and packaged gravies. Worcestershire sauce. Tartar sauce. Barbecue sauce. Teriyaki sauce. Soy sauce, including reduced-sodium. Steak sauce. Fish sauce.  [employer] sauce. Cocktail sauce. Horseradish that you find on the shelf. Regular ketchup and mustard. Meat flavorings and tenderizers. Bouillon cubes. Hot sauce. Pre-made or packaged marinades. Pre-made or packaged taco seasonings. Relishes. Regular salad dressings. Salsa. Other foods  Salted popcorn and pretzels. Corn chips and puffs. Potato and tortilla chips. Canned or dried soups. Pizza. Frozen entrees and pot pies. The items listed above may not be a complete list of foods and beverages you should avoid. Contact a dietitian for more information. Summary  Eating less sodium can help lower your blood pressure, reduce swelling, and protect your heart, liver, and kidneys. Most people on this plan should limit their sodium intake to 1,500-2,000 mg (milligrams) of sodium each day. Canned, boxed, and frozen foods are high in sodium. Restaurant foods, fast foods, and pizza are also very high in sodium. You also get sodium by adding salt to food. Try to cook at home, eat more fresh fruits and vegetables, and eat less fast food and canned, processed, or prepared foods. This information is not intended to replace advice given to you by your health care provider. Make sure you discuss any questions you have with your health care provider. Document Revised: 01/22/2021 Document Reviewed: 11/18/2020  [employer] Patient Education © 2021 [facility].     Discharge Disposition:   [address] [facility]    Facility: [facility]  Address: [address] [place] [address]   Phone: [phone]   Fax: [phone]     Authorization number: [id number]    Referrals:  Monitor medications  Occupational therapy  Physical therapy  Rehab eval and treat  Skilled nursing: medication and disease management    Transfer Checklist:  Patient provided choice of providers.     Transportation:    Pick-up time:   AP/Nursing notified of  time: not applicable  Facility notified of  time: not applicable      During the TCM phone call patient stated:  TCM Call     Date and time call was made  1/8/2020  6:17 PM    Patient was hospitialized at  Other (comment)    [address]     Date of Admission  12/20/19    Date of discharge  01/07/20    Diagnosis  Closed fracture of multiple pubic rami, right,     Disposition  Rehabilitation center; Home      TCM Call     Scheduled for follow up? Yes    Is transportation to your appointment needed  Yes    Have you fallen in the last 12 months  Yes    Interperter language line needed  No        HPI  Review of Systems   All other systems reviewed and are negative. Objective     /80 (BP Location: Right arm, Patient Position: Sitting, Cuff Size: Standard)   Pulse 69   Temp 98.3 °F (36.8 °C) (Temporal)   Ht 5' 3" (1.6 m)   Wt 73.9 kg (163 lb)   SpO2 99%   BMI 28.87 kg/m²      Physical Exam  Vitals and nursing note reviewed. Constitutional:       Appearance: She is not ill-appearing. HENT:      Head: Normocephalic and atraumatic. Cardiovascular:      Rate and Rhythm: Normal rate. Heart sounds: Murmur (significant systolic murmur) heard. Pulmonary:      Effort: Pulmonary effort is normal.      Breath sounds: No wheezing, rhonchi or rales. Musculoskeletal:      Cervical back: Neck supple. Comments: She is in a wheelchair here today as "I got hot in the waiting room."  Uses a walker at home, otherwise. Skin:     General: Skin is warm and dry. Neurological:      General: No focal deficit present. Mental Status: She is alert. Psychiatric:         Mood and Affect: Mood normal.         Behavior: Behavior normal.         Thought Content:  Thought content normal.         Judgment: Judgment normal.       Medications have been reviewed by provider in current encounter    [address] [facility], DO

## 2023-08-04 ENCOUNTER — TELEPHONE (OUTPATIENT)
Dept: FAMILY MEDICINE CLINIC | Facility: CLINIC | Age: 88
End: 2023-08-04

## 2023-08-04 DIAGNOSIS — N18.32 STAGE 3B CHRONIC KIDNEY DISEASE (HCC): Primary | ICD-10-CM

## 2023-08-04 DIAGNOSIS — I50.9 CONGESTIVE HEART FAILURE, UNSPECIFIED HF CHRONICITY, UNSPECIFIED HEART FAILURE TYPE (HCC): Primary | ICD-10-CM

## 2023-08-04 NOTE — TELEPHONE ENCOUNTER
Received call from [facility] stating pt is due for BMP but they could draw it so was asking for us to set her up with [facility]-there are no orders for BMP    Order pended